# Patient Record
Sex: FEMALE | Race: WHITE | NOT HISPANIC OR LATINO | Employment: FULL TIME | URBAN - METROPOLITAN AREA
[De-identification: names, ages, dates, MRNs, and addresses within clinical notes are randomized per-mention and may not be internally consistent; named-entity substitution may affect disease eponyms.]

---

## 2017-04-11 PROCEDURE — G0145 SCR C/V CYTO,THINLAYER,RESCR: HCPCS | Performed by: OBSTETRICS & GYNECOLOGY

## 2017-04-12 ENCOUNTER — LAB REQUISITION (OUTPATIENT)
Dept: LAB | Facility: HOSPITAL | Age: 27
End: 2017-04-12
Payer: COMMERCIAL

## 2017-04-12 DIAGNOSIS — Z01.419 ENCOUNTER FOR GYNECOLOGICAL EXAMINATION WITHOUT ABNORMAL FINDING: ICD-10-CM

## 2017-04-12 DIAGNOSIS — Z11.51 ENCOUNTER FOR SCREENING FOR HUMAN PAPILLOMAVIRUS (HPV): ICD-10-CM

## 2017-04-19 LAB
LAB AP GYN PRIMARY INTERPRETATION: NORMAL
LAB AP LMP: NORMAL
Lab: NORMAL
PATH INTERP SPEC-IMP: NORMAL

## 2018-06-09 ENCOUNTER — HOSPITAL ENCOUNTER (EMERGENCY)
Facility: HOSPITAL | Age: 28
Discharge: HOME | End: 2018-06-09
Attending: EMERGENCY MEDICINE
Payer: COMMERCIAL

## 2018-06-09 ENCOUNTER — APPOINTMENT (EMERGENCY)
Dept: RADIOLOGY | Facility: HOSPITAL | Age: 28
End: 2018-06-09
Payer: COMMERCIAL

## 2018-06-09 VITALS
OXYGEN SATURATION: 98 % | RESPIRATION RATE: 18 BRPM | DIASTOLIC BLOOD PRESSURE: 93 MMHG | HEART RATE: 91 BPM | SYSTOLIC BLOOD PRESSURE: 153 MMHG | TEMPERATURE: 98.3 F

## 2018-06-09 DIAGNOSIS — M25.512 ACUTE PAIN OF LEFT SHOULDER: Primary | ICD-10-CM

## 2018-06-09 LAB
ATRIAL RATE: 87 BPM
P AXIS: 40 DEGREES
PR INTERVAL: 132 MS
QRS AXIS: 25 DEGREES
QRSD INTERVAL: 94 MS
QT INTERVAL: 342 MS
QTC INTERVAL: 405 MS
T WAVE AXIS: 53 DEGREES
VENTRICULAR RATE: 84 BPM

## 2018-06-09 PROCEDURE — 93010 ELECTROCARDIOGRAM REPORT: CPT | Performed by: INTERNAL MEDICINE

## 2018-06-09 PROCEDURE — 99283 EMERGENCY DEPT VISIT LOW MDM: CPT

## 2018-06-09 PROCEDURE — 73030 X-RAY EXAM OF SHOULDER: CPT

## 2018-06-09 PROCEDURE — 93005 ELECTROCARDIOGRAM TRACING: CPT

## 2018-06-09 RX ORDER — METHYLPREDNISOLONE 4 MG/1
TABLET ORAL
Qty: 21 TABLET | Refills: 0 | Status: SHIPPED | OUTPATIENT
Start: 2018-06-09 | End: 2018-08-05

## 2018-06-09 RX ORDER — BUSPIRONE HYDROCHLORIDE 5 MG/1
5 TABLET ORAL AS NEEDED
COMMUNITY
End: 2018-08-05

## 2018-06-09 RX ORDER — MULTIVIT WITH MINERALS/LUTEIN
1000 TABLET ORAL DAILY
COMMUNITY
End: 2018-08-05

## 2018-06-09 RX ORDER — TERBINAFINE HYDROCHLORIDE 250 MG/1
TABLET ORAL DAILY
COMMUNITY
End: 2018-08-05

## 2018-06-09 NOTE — DISCHARGE INSTRUCTIONS
Shoulder Bursitis   WHAT YOU NEED TO KNOW:   Shoulder bursitis is inflammation of the bursa in your shoulder  The bursa is a fluid-filled sac that acts as a cushion between a bone and a tendon  A tendon is a cord of strong tissue that connects muscles to bones  DISCHARGE INSTRUCTIONS:   Medicines:   · NSAIDs:  These medicines decrease swelling, pain, and fever  NSAIDs are available without a doctor's order  Ask your healthcare provider which medicine is right for you  Ask how much to take and when to take it  Take as directed  NSAIDs can cause stomach bleeding and kidney problems if not taken correctly  · Take your medicine as directed  Contact your healthcare provider if you think your medicine is not helping or if you have side effects  Tell him of her if you are allergic to any medicine  Keep a list of the medicines, vitamins, and herbs you take  Include the amounts, and when and why you take them  Bring the list or the pill bottles to follow-up visits  Carry your medicine list with you in case of an emergency  Self-care:   · Rest:  Rest your shoulder as much as possible to decrease pain and swelling  Slowly start to do more each day  Return to your daily activities as directed  · Ice:  Ice helps decrease swelling and pain  Ice may also help prevent tissue damage  Use an ice pack, or put crushed ice in a plastic bag  Cover it with a towel and place it on your shoulder for 15 to 20 minutes, 3 to 4 times each day, as directed  · Heat:  Heat helps decrease pain and stiffness  Apply heat on the area for 15 to 20 minutes, 3 to 4 times each day, as directed  · Sleep position:  Try to avoid lying on your injured shoulder  You may be more comfortable if you sleep on your stomach or back  Physical therapy:  A physical therapist teaches you exercises to help improve movement and strength, and to decrease pain     Prevent another shoulder injury:  Always stretch and do warmup and cool-down exercises before and after you exercise  This will help loosen your muscles and decrease stress on your shoulder  Rest between workouts  Follow up with your healthcare provider as directed:  Write down your questions so you remember to ask them during your visits  Contact your healthcare provider if:   · Your redness, pain, and swelling increase  · Your symptoms do not improve with treatment  · You have a fever  · You have questions or concerns about your condition or care  © 2017 [address] [facility] Information is for End User's use only and may not be sold, redistributed or otherwise used for commercial purposes  All illustrations and images included in CareNotes® are the copyrighted property of A D A M , Inc  or Jose Raines  The above information is an  only  It is not intended as medical advice for individual conditions or treatments  Talk to your doctor, nurse or pharmacist before following any medical regimen to see if it is safe and effective for you

## 2018-06-09 NOTE — ED PROVIDER NOTES
History  Chief Complaint   Patient presents with    Shoulder Pain     pt with left shoulder pain x 2 months, intermittent, has been getting worse over the past week  Pt has seen PCP was told pulled muscle, pt has not taken any medication for pain, no ROM limitation, + numbness and tingling down arm     58-year-old female presents chief complaint of left shoulder pain  Pain is in the anterior aspect of the left shoulder over the [facility] joint but does radiate into the shoulder blade and down in her left arm  Patient reports she was concerned about this possibly being related to her heart  This has been ongoing for a couple of months  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 as she is left handed  No chest pain, shortness of breath, palpitations, nausea, vomiting or diaphoresis  Patient has no significant past medical history  No loss of strength or perfusion in the arm  No numbness  History provided by:  Patient   used: No    Shoulder Pain   Location:  Shoulder  Shoulder location:  L shoulder  Pain details:     Quality:  Aching    Radiates to:  L arm    Severity:  Moderate    Onset quality:  Gradual    Duration:  2 months    Timing:  Intermittent    Progression:  Waxing and waning  Handedness:  Left-handed  Prior injury to area:  No  Relieved by:  Immobilization  Worsened by: Movement and stretching area  Ineffective treatments:  None tried  Associated symptoms: tingling    Associated symptoms: no fever, no muscle weakness, no stiffness and no swelling        Prior to Admission Medications   Prescriptions Last Dose Informant Patient Reported? Taking?    Ascorbic Acid (VITAMIN C) 1000 MG tablet   Yes Yes   Sig: Take 1,000 mg by mouth daily   Norethin-Eth Estradiol-Fe (GENERESS FE PO)   Yes Yes   Sig: Take 1 tablet by mouth daily   busPIRone (BUSPAR) 5 mg tablet   Yes Yes   Sig: Take 5 mg by mouth as needed   terbinafine (LamISIL) 250 mg tablet Yes Yes   Sig: Take by mouth daily Dose unknown      Facility-Administered Medications: None       Past Medical History:   Diagnosis Date    Kidney stones        Past Surgical History:   Procedure Laterality Date    LITHOTRIPSY  2011    MOUTH SURGERY         History reviewed  No pertinent family history  I have reviewed and agree with the history as documented  Social History   Substance Use Topics    Smoking status: Never Smoker    Smokeless tobacco: Never Used    Alcohol use 1 2 oz/week     2 Cans of beer per week      Comment: socially        Review of Systems   Constitutional: Negative for chills, diaphoresis and fever  Respiratory: Negative for shortness of breath  Cardiovascular: Negative for chest pain and palpitations  Gastrointestinal: Negative for diarrhea, nausea and vomiting  Genitourinary: Negative for dysuria and frequency  Musculoskeletal: Positive for arthralgias (left shoulder)  Negative for stiffness  Skin: Negative for rash  All other systems reviewed and are negative  Physical Exam  Physical Exam   Constitutional: She is oriented to person, place, and time  She appears well-developed and well-nourished  No distress  HENT:   Head: Normocephalic and atraumatic  Eyes: EOM are normal  Pupils are equal, round, and reactive to light  Neck: Normal range of motion  No JVD present  Cardiovascular: Normal rate, regular rhythm, normal heart sounds and intact distal pulses  Exam reveals no gallop and no friction rub  No murmur heard  Pulmonary/Chest: Effort normal and breath sounds normal  No respiratory distress  She has no wheezes  She has no rales  She exhibits no tenderness  Musculoskeletal: Normal range of motion  She exhibits tenderness (left ac joint)  She exhibits no deformity  Neurological: She is alert and oriented to person, place, and time  Skin: Skin is warm and dry  Psychiatric: She has a normal mood and affect   Her behavior is normal  Judgment and thought content normal    Nursing note and vitals reviewed  Vital Signs  ED Triage Vitals [06/09/18 1102]   Temperature Pulse Respirations Blood Pressure SpO2   98 3 °F (36 8 °C) 91 18 153/93 98 %      Temp Source Heart Rate Source Patient Position - Orthostatic VS BP Location FiO2 (%)   Oral Monitor Sitting Left arm --      Pain Score       5           Vitals:    06/09/18 1102   BP: 153/93   Pulse: 91   Patient Position - Orthostatic VS: Sitting       Visual Acuity      ED Medications  Medications - No data to display    Diagnostic Studies  Results Reviewed     None                 XR shoulder 2+ views LEFT   ED Interpretation by Dash Yusuf MD (06/09 1156)   This film was interpreted independently by me  No fracture or dislocation                     Procedures  ECG 12 Lead Documentation  Date/Time: 6/9/2018 11:50 AM  Performed by: Winston Sibley by: Evin Crawford     Indications / Diagnosis:  Left shoulder pain  ECG reviewed by me, the ED Provider: yes    Patient location:  ED  Previous ECG:     Previous ECG:  Unavailable    Comparison to cardiac monitor: No    Interpretation:     Interpretation: normal    Rate:     ECG rate:  84    ECG rate assessment: normal    Rhythm:     Rhythm: sinus rhythm    Ectopy:     Ectopy: none    QRS:     QRS axis:  Normal  Conduction:     Conduction: abnormal      Abnormal conduction: incomplete RBBB    ST segments:     ST segments:  Normal  T waves:     T waves: normal             Phone Contacts  ED Phone Contact    ED Course                               MDM  Number of Diagnoses or Management Options  Acute pain of left shoulder: new and requires workup  Diagnosis management comments: Background: 32 y o  female presents with left shoulder pain    Differential DX includes but is not limited to: ac joint sprain, subacromial bursitis, doubt atypical acs,     Plan: xray, ekg, likely treat with anti-inflammatory regimen and refer to physical therapy Amount and/or Complexity of Data Reviewed  Tests in the radiology section of CPT®: ordered and reviewed  Tests in the medicine section of CPT®: ordered and reviewed    Risk of Complications, Morbidity, and/or Mortality  Diagnostic procedures: high    Patient Progress  Patient progress: stable    CritCare Time    Disposition  Final diagnoses:   Acute pain of left shoulder     Time reflects when diagnosis was documented in both MDM as applicable and the Disposition within this note     Time User Action Codes Description Comment    6/9/2018 11:56 AM Ashanti FULLER Add [D21 178] Acute pain of left shoulder       ED Disposition     ED Disposition Condition Comment    Discharge  [address] discharge to home/self care  Condition at discharge: Good        Follow-up Information     Follow up With Specialties Details Why [address]   As needed [place]  [phone]          Patient's Medications   Discharge Prescriptions    METHYLPREDNISOLONE (MEDROL) 4 MG TABLET    Dosepack (follow instructions on packaging) All doses PO, 6 tabs/day1, 5 tabs/day 2, 4 tabs/day3, 3 tabs/day4, 2 tabs/day5, 1 tab/day6       Start Date: 6/9/2018  End Date: --       Order Dose: --       Quantity: 21 tablet    Refills: 0     No discharge procedures on file      ED Provider  Electronically Signed by           Ariane Velasco MD  06/09/18 1480

## 2018-06-26 ENCOUNTER — EVALUATION (OUTPATIENT)
Dept: PHYSICAL THERAPY | Facility: CLINIC | Age: 28
End: 2018-06-26
Payer: COMMERCIAL

## 2018-06-26 DIAGNOSIS — M25.512 ACUTE PAIN OF LEFT SHOULDER: Primary | ICD-10-CM

## 2018-06-26 PROCEDURE — G8991 OTHER PT/OT GOAL STATUS: HCPCS | Performed by: PHYSICAL THERAPIST

## 2018-06-26 PROCEDURE — 97162 PT EVAL MOD COMPLEX 30 MIN: CPT | Performed by: PHYSICAL THERAPIST

## 2018-06-26 PROCEDURE — 97110 THERAPEUTIC EXERCISES: CPT | Performed by: PHYSICAL THERAPIST

## 2018-06-26 PROCEDURE — G8990 OTHER PT/OT CURRENT STATUS: HCPCS | Performed by: PHYSICAL THERAPIST

## 2018-06-26 NOTE — PROGRESS NOTES
PT Evaluation     Today's date: 2018  Patient name: Gilda Melendez  : 1990  MRN: [id number]  Referring provider: Cornelio Washington PT  Dx:   Encounter Diagnosis     ICD-10-CM    1  Acute pain of left shoulder M25 512                   Assessment  Impairments: abnormal or restricted ROM, impaired physical strength, pain with function and scapular dyskinesis    Assessment details: Patient presents with signs and symptoms consistent with referring diagnosis  Positive prognostic indicators include:  improved past 2 weeks, motivated to improve Negative prognostic indicators include: work that requires overhead use of shoulder routinely  She presents with: pain, decreased: ROM, strength and  functional capacity  She has mild capsular tightness, poor postural control and positive impingement signs  She requires skilled PT to address these deficits and restore maximal functional capacity  Understanding of Dx/Px/POC: good   Prognosis: good    Goals  ST-6 weeks  1  Patient to be independent with HEP  2   Decrease pain at least 2 subjective levels  LT-12 weeks  1    Patient to voice comfort with self management of condition  2   75% or > decreased pain  3   75% or > decreased functional deficits  4   Normalize AROM of all deficit planes  5   Normalize strength  6   Functional Status Score: 75  7  Patient to voice understanding of activities/positions to avoid        Plan  Patient would benefit from: skilled PT  Referral necessary: No  Planned modality interventions: cryotherapy  Planned therapy interventions: IADL retraining, joint mobilization, manual therapy, motor coordination training, neuromuscular re-education, patient education, postural training, self care, strengthening, stretching, therapeutic activities, therapeutic exercise, home exercise program, flexibility, ADL training, balance and body mechanics training  Frequency: 2x week  Duration in weeks: 6  Treatment plan discussed with: patient        Subjective Evaluation    History of Present Illness  Onset date: 2 months ago  Mechanism of injury: Chief Complaint: L shoulder pain    History:  Pt notes insidious onset of L Shoulder pain 2 months ago but also had some paresthesias into her L UE with occasional parestheias into her L arm/hand  Symptoms were progressively worsening  She was seen in the ER  X rays were negative  She was given pain meds and told to go to PT  Numbness has improved and pain is no longer constant since going to the ER and having a short term medication  She works as a optathalamic assistant with frequent reaching  PMH: kidney stones     Aggravating factors: sleeping (side sleeper), reaching at work  lifiting weights, reaching behind her back  Carrying items  Relieving factors: ice    Functional Deficits: sleeping, lifting weights carrying laundry    Patient Goals: feel better    Quality of life: good    Pain  At best pain ratin  At worst pain ratin  Location: L shoulder      Diagnostic Tests  X-ray: normal        Objective     Postural Observations  Seated posture: fair  Standing posture: fair  Correction of posture: makes symptoms better        Cervical/Thoracic Screen   Cervical range of motion within normal limits  Cervical range of motion within normal limits with the following exceptions: (-) Cervical mechanical assessment/screen on symptoms  (-) Neuromotor exam    Active Range of Motion   Left Shoulder   Flexion: 170 degrees   Abduction: WFL  External rotation 0°: WFL  External rotation BTH: WFL    Passive Range of Motion   Left Shoulder   Normal passive range of motion    Scapular Mobility     Additional Scapular Mobility Details    Mild Scapular Dyskinesis with overhead elevation    Joint Play   Left Shoulder  Hypomobile in the inferior capsule      Strength/Myotome Testing     Left Shoulder     Planes of Motion   Flexion: 4+   Abduction: 4+   External rotation at 0°: 4+ Internal rotation at 0°: 5     Additional Strength Details  Elbow Screen [place]    Tests     Additional Tests Details  (+) TTP SubAcromial Space, LH Bicepts Tendon  (-) Lag signs  (+) Impingement Testing  (-) Labral/Stability Testing          Precautions:  Direct Access    Daily Treatment Diary       Manuals 6/26         Inf/lat [facility] mobs                                                   Exercise Diary          HEP 10'         Scap Squeezes          Sleeper Str          TB Rows          TB Shld Ext          SL ER          Prone Row/Ret          Prone Shld Ext          Prone Hoiz Abd          Doorway Pec Str          Serratus Alphabet                                                                                                              Modalities          CP prn

## 2018-06-28 ENCOUNTER — OFFICE VISIT (OUTPATIENT)
Dept: PHYSICAL THERAPY | Facility: CLINIC | Age: 28
End: 2018-06-28
Payer: COMMERCIAL

## 2018-06-28 DIAGNOSIS — M25.512 ACUTE PAIN OF LEFT SHOULDER: Primary | ICD-10-CM

## 2018-06-28 PROCEDURE — 97140 MANUAL THERAPY 1/> REGIONS: CPT | Performed by: PHYSICAL THERAPIST

## 2018-06-28 PROCEDURE — 97112 NEUROMUSCULAR REEDUCATION: CPT | Performed by: PHYSICAL THERAPIST

## 2018-06-28 PROCEDURE — 97110 THERAPEUTIC EXERCISES: CPT | Performed by: PHYSICAL THERAPIST

## 2018-06-28 NOTE — PROGRESS NOTES
Daily Note     Today's date: 2018  Patient name: Paty Brown  : 1990  MRN: [id number]  Referring provider: Malini Tovar, PT  Dx:   Encounter Diagnosis     ICD-10-CM    1  Acute pain of left shoulder M25 512                   Subjective: Shoulder feels a bit better, did initiate HEP      Objective: See treatment diary below  Precautions:  Direct Access    Daily Treatment Diary       Manuals         Inf/lat GH mobs  10'                                                 Exercise Diary          HEP 10'         Scap Squeezes          Sleeper Str  :10/10x        TB Rows  Gr :03/20x        TB Shld Ext  :03/20x        SL ER  :03/20x        Prone Row/Ret  2# 20x        Prone Shld Ext  2# 20x        Prone Hoiz Abd  2# 20x        Doorway Pec Str  :20/6x        Serratus Alphabet  2# 1x                                                                                                            Modalities          CP prn                        Assessment: Tolerated treatment well  Patient would benefit from continued PT      Plan: Continue per plan of care

## 2018-07-03 ENCOUNTER — APPOINTMENT (OUTPATIENT)
Dept: PHYSICAL THERAPY | Facility: CLINIC | Age: 28
End: 2018-07-03
Payer: COMMERCIAL

## 2018-07-05 ENCOUNTER — OFFICE VISIT (OUTPATIENT)
Dept: PHYSICAL THERAPY | Facility: CLINIC | Age: 28
End: 2018-07-05
Payer: COMMERCIAL

## 2018-07-05 DIAGNOSIS — M25.512 ACUTE PAIN OF LEFT SHOULDER: Primary | ICD-10-CM

## 2018-07-05 PROCEDURE — 97110 THERAPEUTIC EXERCISES: CPT | Performed by: PHYSICAL THERAPIST

## 2018-07-05 PROCEDURE — 97112 NEUROMUSCULAR REEDUCATION: CPT | Performed by: PHYSICAL THERAPIST

## 2018-07-05 NOTE — PROGRESS NOTES
Daily Note     Today's date: 2018  Patient name: Gilda Melendez  : 1990  MRN: [id number]  Referring provider: Cornelio Washington, PT  Dx:   Encounter Diagnosis     ICD-10-CM    1  Acute pain of left shoulder M25 512                   Subjective: Patient with no significant change in sx since LV  Objective: See treatment diary below  Manuals        Inf/lat GH mobs  10' 10'                                                Exercise Diary          HEP 10'         Scap Squeezes          Sleeper Str  :10/10x hep       TB Rows  Gr :03/20x X 20 GR       TB Shld Ext  :03/20x X 20       SL ER  :03/20x X 20       Prone Row/Ret  2# 20x 2# x 20       Prone Shld Ext  2# 20x 2#x20       Prone Hoiz Abd  2# 20x 2# x 20       Doorway Pec Str  :20/6x 6 x :20       Serratus Alphabet  2# 1x 2# x 2       Pulley  5' 5'       TB ER/IR   GTB x 20                                                                                       Modalities          CP   10'                       Assessment: Tolerated treatment well  Patient would benefit from continued PT  Unbilled x 15'      Plan: Continue per plan of care

## 2018-07-10 ENCOUNTER — OFFICE VISIT (OUTPATIENT)
Dept: PHYSICAL THERAPY | Facility: CLINIC | Age: 28
End: 2018-07-10
Payer: COMMERCIAL

## 2018-07-10 DIAGNOSIS — M25.512 ACUTE PAIN OF LEFT SHOULDER: Primary | ICD-10-CM

## 2018-07-10 PROCEDURE — 97140 MANUAL THERAPY 1/> REGIONS: CPT | Performed by: PHYSICAL THERAPIST

## 2018-07-10 PROCEDURE — 97110 THERAPEUTIC EXERCISES: CPT | Performed by: PHYSICAL THERAPIST

## 2018-07-10 NOTE — PROGRESS NOTES
Daily Note     Today's date: 7/10/2018  Patient name: Be Sofia  : 1990  MRN: [id number]  Referring provider: Rukhsana Kiran PT  Dx:   Encounter Diagnosis     ICD-10-CM    1  Acute pain of left shoulder M25 512                   Subjective: Patient states she was a bit sore after swimming yesterday and driving due to anterior shoulder pain  Objective: See treatment diary below  Manuals        Inf/lat GH mobs  10' 10'                                                Exercise Diary          HEP 10'         Scap Squeezes          Sleeper Str  :10/10x hep       TB Rows  Gr :03/20x X 20 GR       TB Shld Ext  :03/20x X 20       SL ER  :03/20x X 20       Prone Row/Ret  2# 20x 2# x 20       Prone Shld Ext  2# 20x 2#x20       Prone Hoiz Abd  2# 20x 2# x 20       Doorway Pec Str  :20/6x 6 x :20       Serratus Alphabet  2# 1x 2# x 2       Pulley  5' 5'       TB ER/IR   GTB x 20       Body blade m/l a/p   3x :20       Bent over rows          Quad ue/LE                                                            Modalities          CP   10'                       Assessment: Tolerated treatment well  Patient would benefit from continued PT  Progressed with body blade and had some soreness afterwards  Updated HEP  Plan: Continue per plan of care

## 2018-07-12 ENCOUNTER — APPOINTMENT (OUTPATIENT)
Dept: PHYSICAL THERAPY | Facility: CLINIC | Age: 28
End: 2018-07-12
Payer: COMMERCIAL

## 2018-07-17 ENCOUNTER — OFFICE VISIT (OUTPATIENT)
Dept: PHYSICAL THERAPY | Facility: CLINIC | Age: 28
End: 2018-07-17
Payer: COMMERCIAL

## 2018-07-17 DIAGNOSIS — M25.512 ACUTE PAIN OF LEFT SHOULDER: Primary | ICD-10-CM

## 2018-07-17 PROCEDURE — 97110 THERAPEUTIC EXERCISES: CPT | Performed by: PHYSICAL THERAPIST

## 2018-07-17 PROCEDURE — 97112 NEUROMUSCULAR REEDUCATION: CPT | Performed by: PHYSICAL THERAPIST

## 2018-07-17 PROCEDURE — 97140 MANUAL THERAPY 1/> REGIONS: CPT | Performed by: PHYSICAL THERAPIST

## 2018-07-17 NOTE — PROGRESS NOTES
Daily Note     Today's date: 2018  Patient name: Marie Ravi  : 1990  MRN: [id number]  Referring provider: Nils Carroll, PT  Dx:   Encounter Diagnosis     ICD-10-CM    1  Acute pain of left shoulder M25 512                   Subjective: Pt reports her shoulder if feeling good  She said she felt a little muscle soreness after her last therapy session  She said overall she is feeling really well  Objective: See treatment diary below      Assessment: Tolerated treatment well  Patient demonstrated fatigue post treatment, exhibited good technique with therapeutic exercises, would benefit from continued PT and did well with BTB and synamic stabilization exercises  She was given a BTB to take home becaue she expressed interest in performing band exercises at home  Plan: Continue per plan of care  Progress treatment as tolerated        Manuals       Inf/lat GH mobs  10' 10' 5'      STM L upper trap    5'                                     Exercise Diary          HEP 10'         Scap Squeezes          Sleeper Str  :10/10x hep       TB Rows  Gr :03/20x X 21 GR BTB x15      TB Shld Ext  :03/20x X 20 BTB x15      SL ER  :03/20x X 20 x20      Prone Row/Ret  2# 20x 2# x 20 2# 20x      Prone Shld Ext  2# 20x 2#x20 2# 20x      Prone Hoiz Abd  2# 20x 2# x 20 2# 20x      Doorway Pec Str  :20/6x 6 x :20 20"x6 L      Serratus Alphabet  2# 1x 2# x 2 2# 2x      Pulley  5' 5' 5'      TB ER/IR   GTB x 20 GTB x20      Body blade m/l a/p   3x :20 20"x3      Bent over rows          Quad ue/LE          Supine dynamic stabilization    15"x3                                              Modalities          CP   10' 10'

## 2018-07-19 ENCOUNTER — OFFICE VISIT (OUTPATIENT)
Dept: PHYSICAL THERAPY | Facility: CLINIC | Age: 28
End: 2018-07-19
Payer: COMMERCIAL

## 2018-07-19 DIAGNOSIS — M25.512 ACUTE PAIN OF LEFT SHOULDER: Primary | ICD-10-CM

## 2018-07-19 PROCEDURE — 97110 THERAPEUTIC EXERCISES: CPT | Performed by: PHYSICAL THERAPIST

## 2018-07-19 PROCEDURE — 97140 MANUAL THERAPY 1/> REGIONS: CPT | Performed by: PHYSICAL THERAPIST

## 2018-07-19 PROCEDURE — 97112 NEUROMUSCULAR REEDUCATION: CPT | Performed by: PHYSICAL THERAPIST

## 2018-07-19 NOTE — PROGRESS NOTES
Daily Note     Today's date: 2018  Patient name: Marie Ravi  : 1990  MRN: [id number]  Referring provider: Nils Carroll, PT  Dx:   Encounter Diagnosis     ICD-10-CM    1  Acute pain of left shoulder M25 512                   Subjective: Pt denies pain upon starting or ending PT session  She does report that her primary limtiaiotn remains pain with OH activities  She went to a ramu class and noted pain after this, rating her symptoms at a 5/10  Objective: See treatment diary below      Assessment: Tolerated treatment well  Patient demonstrated fatigue post treatment, exhibited good technique with therapeutic exercises, would benefit from continued PT and will work on [facility] mechanics and scapular stabilizers when performing OH activities,      Plan: Continue per plan of care  Progress treatment as tolerated  Re-eval NV        Manuals      Inf/lat GH mobs  10' 10' 5' 5'     STM L upper trap    5' 5'                                    Exercise Diary          HEP 10'         Scap Squeezes          Sleeper Str  :10/10x hep       TB Rows  Gr :03/20x X 21 GR BTB x15 BTB x15     TB Shld Ext  :03/20x X 20 BTB x15 BTB x15     SL ER  :03/20x X 20 x20      Prone Row/Ret  2# 20x 2# x 20 2# 20x 2# 20x     Prone Shld Ext  2# 20x 2#x20 2# 20x 2# 20x     Prone Hoiz Abd  2# 20x 2# x 20 2# 20x 2# 20x     Doorway Pec Str  :20/6x 6 x :20 20"x6 L 20"x6     Serratus Alphabet  2# 1x 2# x 2 2# 2x 2# 2x     Pulley  5' 5' 5' 5'     TB ER/IR   GTB x 20 GTB x20 GTB 20x     Body blade m/l a/p   3x :20 20"x3 20"x3     Bent over rows          Quad ue/LE          Supine dynamic stabilization    15"x3 15"x3                                             Modalities          CP   10' 10'

## 2018-07-24 ENCOUNTER — EVALUATION (OUTPATIENT)
Dept: PHYSICAL THERAPY | Facility: CLINIC | Age: 28
End: 2018-07-24
Payer: COMMERCIAL

## 2018-07-24 ENCOUNTER — TRANSCRIBE ORDERS (OUTPATIENT)
Dept: PHYSICAL THERAPY | Facility: CLINIC | Age: 28
End: 2018-07-24

## 2018-07-24 DIAGNOSIS — M25.512 LEFT SHOULDER PAIN, UNSPECIFIED CHRONICITY: Primary | ICD-10-CM

## 2018-07-24 DIAGNOSIS — M25.512 ACUTE PAIN OF LEFT SHOULDER: Primary | ICD-10-CM

## 2018-07-24 PROCEDURE — 97112 NEUROMUSCULAR REEDUCATION: CPT | Performed by: PHYSICAL THERAPIST

## 2018-07-24 PROCEDURE — 97110 THERAPEUTIC EXERCISES: CPT | Performed by: PHYSICAL THERAPIST

## 2018-07-24 PROCEDURE — G8990 OTHER PT/OT CURRENT STATUS: HCPCS | Performed by: PHYSICAL THERAPIST

## 2018-07-24 PROCEDURE — 97140 MANUAL THERAPY 1/> REGIONS: CPT | Performed by: PHYSICAL THERAPIST

## 2018-07-24 PROCEDURE — G8991 OTHER PT/OT GOAL STATUS: HCPCS | Performed by: PHYSICAL THERAPIST

## 2018-07-24 NOTE — PROGRESS NOTES
PT Re-Evaluation     Today's date: 2018  Patient name: Tammy Goodman  : 1990  MRN: [id number]  Referring provider: Fredrica Rinne, C*  Dx:   Encounter Diagnosis     ICD-10-CM    1  Acute pain of left shoulder M25 512                   Assessment  Impairments: abnormal or restricted ROM, impaired physical strength, pain with function and scapular dyskinesis    Assessment details: Tammy Goodman has attended 7  visits since initiating skilled PT and has demonstrated overall improvement in mobility, strength, and function, with a reduction in pain  Currently, they have made steady progress towards their goals, but continue to remain limited with        At this time, skilled physical therapy is warranted in order to address their remaining impairments and aide in return to functional activities  It is recommended that they continue to be seen 2x/week for an additional 4 weeks  Thank you for this pleasant referral!     Understanding of Dx/Px/POC: good   Prognosis: good    Goals  ST-6 weeks  1  Patient to be independent with HEP  -MET  2  Decrease pain at least 2 subjective levels  -MET    LT-12 weeks  1    Patient to voice comfort with self management of condition - PARTIALLY MET  2   75% or > decreased pain  - MET  3   75% or > decreased functional deficits  - PARTIALLY MET  4  Normalize AROM of all deficit planes  - MET  5  Normalize strength  - PARTIALLY MET  6  Functional Status Score: 75- MET  7  Patient to voice understanding of activities/positions to avoid  - MET      Plan  Patient would benefit from: skilled PT  Referral necessary: No  Planned modality interventions: cryotherapy and biofeedback  Planned therapy interventions: IADL retraining, joint mobilization, manual therapy, motor coordination training, neuromuscular re-education, patient education, postural training, self care, strengthening, stretching, therapeutic activities, therapeutic exercise, home exercise program, flexibility, ADL training, balance and body mechanics training  Frequency: 2x week  Duration in weeks: 3  Treatment plan discussed with: patient        Subjective Evaluation    History of Present Illness  Onset date: 2 months ago  Mechanism of injury: Chief Complaint: L shoulder pain    History:  Pt reports that her left shoulder has improved overall  She notes that she still has pain with repetitive overhead reaching, or quick movements  She has also began to notice some pain in her right shoulder that has begun over the past week and has not improved with rest  She notes that lifting things like her laundry basket are painful  PMH: kidney stones     Aggravating factors: sleeping (side sleeper), reaching at work  lifiting weights, reaching behind her back  Carrying items  Relieving factors: ice    Functional Deficits: sleeping, lifting weights carrying laundry    Patient Goals: feel better    Quality of life: good    Pain  At best pain ratin  At worst pain ratin  Location: L shoulder      Diagnostic Tests  X-ray: normal        Objective     Postural Observations  Seated posture: good  Standing posture: good  Correction of posture: makes symptoms better        Cervical/Thoracic Screen   Cervical range of motion within normal limits  Cervical range of motion within normal limits with the following exceptions: (-) Cervical mechanical assessment/screen on symptoms  (-) Neuromotor exam    Active Range of Motion   Left Shoulder   Flexion: WFL  Abduction: WFL  External rotation 0°: WFL  External rotation BTH: WFL    Passive Range of Motion   Left Shoulder   Normal passive range of motion    Scapular Mobility     Additional Scapular Mobility Details    Mild Scapular Dyskinesis with overhead elevation (REMAINS)    Joint Play   Left Shoulder  Hypomobile in the inferior capsule      Strength/Myotome Testing     Left Shoulder     Planes of Motion   Flexion: 5   Abduction: 5   External rotation at 0°: 4+ Internal rotation at 0°: 5     Additional Strength Details  [facility] [place]    Tests     Additional Tests Details  (+) TTP SubAcromial Space, LH Bicepts Tendon- RESOLVED ON L, REMAINS ON R  (-) Lag signs  (+) Impingement Testing- RESOLVED ON L, REMAINS ON R  (-) Labral/Stability Testing      Flowsheet Rows      Most Recent Value   PT/OT G-Codes   Current Score  84   Projected Score  75   FOTO information reviewed  Yes   Assessment Type  Re-evaluation   G code set  Other PT/OT Primary   Other PT Primary Current Status ()  CI   Other PT Primary Goal Status ()  CJ          Precautions:  Direct Access    Manuals 6/26 6/28 7/5 7/17 7/19 7/24    Inf/lat GH mobs  10' 10' 5' 5' 5'    STM L upper trap    5' 5' 5'                                   Exercise Diary          HEP 10'         Scap Squeezes          Sleeper Str  :10/10x hep       TB Rows  Gr :03/20x X 21 GR BTB x15 BTB x15 BTB     TB Shld Ext  :03/20x X 20 BTB x15 BTB x15 BTB x15    SL ER  :03/20x X 20 x20  20x    Prone Row/Ret  2# 20x 2# x 20 2# 20x 2# 20x 2# 20x    Prone Shld Ext  2# 20x 2#x20 2# 20x 2# 20x 2# 20x    Prone Hoiz Abd  2# 20x 2# x 20 2# 20x 2# 20x 2# 20x    Doorway Pec Str  :20/6x 6 x :20 20"x6 L 20"x6 20"x6     Serratus Alphabet  2# 1x 2# x 2 2# 2x 2# 2x 2# 2x    Pulley  5' 5' 5' 5' 5'    TB ER/IR   GTB x 20 GTB x20 GTB 20x GTB 20x    Body blade m/l a/p   3x :20 20"x3 20"x3 20"x3    Bent over rows          Quad ue/LE          Supine dynamic stabilization    15"x3 15"x3 15" x3                                            Modalities          CP   10' 10'

## 2018-07-24 NOTE — LETTER
2018    Sandra Cifuentes, [address]  [address] [place], [place] [address]  [address]    Patient: Roxanna Corbin   YOB: 1990   Date of Visit: 2018     Encounter Diagnosis     ICD-10-CM    1  Acute pain of left shoulder M25 512        Dear Dr Amber Gamboa:    Please review the attached Plan of Care from [facility]'s recent visit  Please verify that you agree therapy should continue by signing the attached document and sending it back to our office  If you have any questions or concerns, please don't hesitate to call  Sincerely,    Collette Smalls, PT      Referring Provider:      I certify that I have read the below Plan of Care and certify the need for these services furnished under this plan of treatment while under my care  Joy Babinzenobiasarah Mary [address] [facility]  [address]  [address]: [phone]          PT Re-Evaluation     Today's date: 2018  Patient name: Roxanna Corbin  : 1990  MRN: [id number]  Referring provider: LEESA Walker*  Dx:   Encounter Diagnosis     ICD-10-CM    1  Acute pain of left shoulder M25 512                   Assessment  Impairments: abnormal or restricted ROM, impaired physical strength, pain with function and scapular dyskinesis    Assessment details: Roxanna Corbin has attended 7  visits since initiating skilled PT and has demonstrated overall improvement in mobility, strength, and function, with a reduction in pain  Currently, they have made steady progress towards their goals, but continue to remain limited with        At this time, skilled physical therapy is warranted in order to address their remaining impairments and aide in return to functional activities  It is recommended that they continue to be seen 2x/week for an additional 4 weeks  Thank you for this pleasant referral!     Understanding of Dx/Px/POC: good   Prognosis: good    Goals  ST-6 weeks  1    Patient to be independent with HEP  -MET  2  Decrease pain at least 2 subjective levels  -MET    LT-12 weeks  1    Patient to voice comfort with self management of condition - PARTIALLY MET  2   75% or > decreased pain  - MET  3   75% or > decreased functional deficits  - PARTIALLY MET  4  Normalize AROM of all deficit planes  - MET  5  Normalize strength  - PARTIALLY MET  6  Functional Status Score: 75- MET  7  Patient to voice understanding of activities/positions to avoid  - MET      Plan  Patient would benefit from: skilled PT  Referral necessary: No  Planned modality interventions: cryotherapy and biofeedback  Planned therapy interventions: IADL retraining, joint mobilization, manual therapy, motor coordination training, neuromuscular re-education, patient education, postural training, self care, strengthening, stretching, therapeutic activities, therapeutic exercise, home exercise program, flexibility, ADL training, balance and body mechanics training  Frequency: 2x week  Duration in weeks: 3  Treatment plan discussed with: patient        Subjective Evaluation    History of Present Illness  Onset date: 2 months ago  Mechanism of injury: Chief Complaint: L shoulder pain    History:  Pt reports that her left shoulder has improved overall  She notes that she still has pain with repetitive overhead reaching, or quick movements  She has also began to notice some pain in her right shoulder that has begun over the past week and has not improved with rest  She notes that lifting things like her laundry basket are painful  PMH: kidney stones     Aggravating factors: sleeping (side sleeper), reaching at work  lifiting weights, reaching behind her back  Carrying items       Relieving factors: ice    Functional Deficits: sleeping, lifting weights carrying laundry    Patient Goals: feel better    Quality of life: good    Pain  At best pain ratin  At worst pain ratin  Location: L shoulder      Diagnostic Tests  X-ray: normal        Objective     Postural Observations  Seated posture: good  Standing posture: good  Correction of posture: makes symptoms better        Cervical/Thoracic Screen   Cervical range of motion within normal limits  Cervical range of motion within normal limits with the following exceptions: (-) Cervical mechanical assessment/screen on symptoms  (-) Neuromotor exam    Active Range of Motion   Left Shoulder   Flexion: WFL  Abduction: WFL  External rotation 0°:  WFL  External rotation BTH: WFL    Passive Range of Motion   Left Shoulder   Normal passive range of motion    Scapular Mobility     Additional Scapular Mobility Details    Mild Scapular Dyskinesis with overhead elevation (REMAINS)    Joint Play   Left Shoulder  Hypomobile in the inferior capsule      Strength/Myotome Testing     Left Shoulder     Planes of Motion   Flexion: 5   Abduction: 5   External rotation at 0°:  4+   Internal rotation at 0°:  5     Additional Strength Details  Elbow Screen [facility]    Tests     Additional Tests Details  (+) TTP SubAcromial Space, LH Bicepts Tendon- RESOLVED ON L, REMAINS ON R  (-) Lag signs  (+) Impingement Testing- RESOLVED ON L, REMAINS ON R  (-) Labral/Stability Testing      Flowsheet Rows      Most Recent Value   PT/OT G-Codes   Current Score  84   Projected Score  75   FOTO information reviewed  Yes   Assessment Type  Re-evaluation   G code set  Other PT/OT Primary   Other PT Primary Current Status ()  CI   Other PT Primary Goal Status ()  CJ          Precautions:  Direct Access    Manuals 6/26 6/28 7/5 7/17 7/19 7/24    Inf/lat GH mobs  10' 10' 5' 5' 5'    STM L upper trap    5' 5' 5'                                   Exercise Diary          HEP 10'         Scap Squeezes          Sleeper Str  :10/10x hep       TB Rows  Gr :03/20x X 21 GR BTB x15 BTB x15 BTB     TB Shld Ext  :03/20x X 20 BTB x15 BTB x15 BTB x15    SL ER  :03/20x X 20 x20  20x    Prone Row/Ret  2# 20x 2# x 20 2# 20x 2# 20x 2# 20x    Prone Shld Ext  2# 20x 2#x20 2# 20x 2# 20x 2# 20x    Prone Hoiz Abd  2# 20x 2# x 20 2# 20x 2# 20x 2# 20x    Doorway Pec Str  :20/6x 6 x :20 20"x6 L 20"x6 20"x6     Serratus Alphabet  2# 1x 2# x 2 2# 2x 2# 2x 2# 2x    Pulley  5' 5' 5' 5' 5'    TB ER/IR   GTB x 20 GTB x20 GTB 20x GTB 20x    Body blade m/l a/p   3x :20 20"x3 20"x3 20"x3    Bent over rows          Quad ue/LE          Supine dynamic stabilization    15"x3 15"x3 15" x3                                            Modalities          CP   10' 10'

## 2018-07-26 ENCOUNTER — APPOINTMENT (OUTPATIENT)
Dept: PHYSICAL THERAPY | Facility: CLINIC | Age: 28
End: 2018-07-26
Payer: COMMERCIAL

## 2018-08-02 ENCOUNTER — OFFICE VISIT (OUTPATIENT)
Dept: PHYSICAL THERAPY | Facility: CLINIC | Age: 28
End: 2018-08-02
Payer: COMMERCIAL

## 2018-08-02 DIAGNOSIS — M25.512 ACUTE PAIN OF LEFT SHOULDER: Primary | ICD-10-CM

## 2018-08-02 PROCEDURE — 97110 THERAPEUTIC EXERCISES: CPT | Performed by: PHYSICAL THERAPIST

## 2018-08-02 PROCEDURE — 97140 MANUAL THERAPY 1/> REGIONS: CPT | Performed by: PHYSICAL THERAPIST

## 2018-08-02 PROCEDURE — 97112 NEUROMUSCULAR REEDUCATION: CPT | Performed by: PHYSICAL THERAPIST

## 2018-08-02 NOTE — PROGRESS NOTES
Daily Note     Today's date: 2018  Patient name: Paty Brown  : 1990  MRN: [id number]  Referring provider: Malini Tovar, PT  Dx:   Encounter Diagnosis     ICD-10-CM    1  Acute pain of left shoulder M25 512                   Subjective: Pt notes that she tried to do push ups the other day and noted pain at that time  It has since subsided and she is feeling much better  Objective: See treatment diary below      Assessment: Tolerated treatment well  Patient demonstrated fatigue post treatment, exhibited good technique with therapeutic exercises, would benefit from continued PT and worked on wall push ups with proper form  Will progress push ups as able,      Plan: Continue per plan of care  Progress treatment as tolerated          Precautions:  Direct Access    Manuals    Inf/lat GH mobs  10' 10' 5' 5' 5' 5'   STM L upper trap    5' 5' 5' 5'                                  Exercise Diary          HEP 10'         Scap Squeezes          Sleeper Str  :10/10x hep       TB Rows  Gr :03/20x X 21 GR BTB x15 BTB x15 BTB  BTB 20x   TB Shld Ext  :03/20x X 20 BTB x15 BTB x15 BTB x15 BTB 20x   SL ER  :03/20x X 20 x20  20x 20x 2#   Prone Row/Ret  2# 20x 2# x 20 2# 20x 2# 20x 2# 20x 2# 20x   Prone Shld Ext  2# 20x 2#x20 2# 20x 2# 20x 2# 20x 2# 20x   Prone Hoiz Abd  2# 20x 2# x 20 2# 20x 2# 20x 2# 20x 2# 20x   Doorway Pec Str  :20/6x 6 x :20 20"x6 L 20"x6 20"x6  30"x4   Serratus Alphabet  2# 1x 2# x 2 2# 2x 2# 2x 2# 2x 2# 2x   Pulley  5' 5' 5' 5' 5' 5'   TB ER/IR   GTB x 20 GTB x20 GTB 20x GTB 20x GTB 20x   Body blade m/l a/p   3x :20 20"x3 20"x3 20"x3 20"x3   Wall push ups       2x10   UBE       NV   Supine dynamic stabilization    15"x3 15"x3 15" x3 15"x3                                           Modalities          CP   10' 10'

## 2018-08-05 ENCOUNTER — HOSPITAL ENCOUNTER (EMERGENCY)
Facility: HOSPITAL | Age: 28
Discharge: HOME | End: 2018-08-05
Attending: EMERGENCY MEDICINE | Admitting: EMERGENCY MEDICINE
Payer: COMMERCIAL

## 2018-08-05 VITALS
HEIGHT: 65 IN | WEIGHT: 155 LBS | TEMPERATURE: 98.7 F | RESPIRATION RATE: 18 BRPM | OXYGEN SATURATION: 99 % | BODY MASS INDEX: 25.83 KG/M2 | DIASTOLIC BLOOD PRESSURE: 75 MMHG | HEART RATE: 83 BPM | SYSTOLIC BLOOD PRESSURE: 110 MMHG

## 2018-08-05 DIAGNOSIS — E86.0 DEHYDRATION: ICD-10-CM

## 2018-08-05 DIAGNOSIS — R55 PRE-SYNCOPE: Primary | ICD-10-CM

## 2018-08-05 LAB
BACTERIA UR QL AUTO: ABNORMAL /HPF
BILIRUB UR QL STRIP: NEGATIVE
CLARITY UR: CLEAR
COLOR UR: YELLOW
COLOR, POC: NORMAL
EXT PREG TEST URINE: NEGATIVE
GLUCOSE UR STRIP-MCNC: NEGATIVE MG/DL
HGB UR QL STRIP.AUTO: ABNORMAL
HYALINE CASTS #/AREA URNS LPF: ABNORMAL /LPF
KETONES UR STRIP-MCNC: NEGATIVE MG/DL
LEUKOCYTE ESTERASE UR QL STRIP: ABNORMAL
NITRITE UR QL STRIP: NEGATIVE
NON-SQ EPI CELLS URNS QL MICRO: ABNORMAL /HPF
PH UR STRIP.AUTO: 6.5 [PH] (ref 4.5–8)
PROT UR STRIP-MCNC: ABNORMAL MG/DL
RBC #/AREA URNS AUTO: ABNORMAL /HPF
SP GR UR STRIP.AUTO: 1.02 (ref 1–1.03)
UROBILINOGEN UR QL STRIP.AUTO: 0.2 E.U./DL
WBC #/AREA URNS AUTO: ABNORMAL /HPF

## 2018-08-05 PROCEDURE — 81001 URINALYSIS AUTO W/SCOPE: CPT

## 2018-08-05 PROCEDURE — 81025 URINE PREGNANCY TEST: CPT | Performed by: EMERGENCY MEDICINE

## 2018-08-05 PROCEDURE — 99284 EMERGENCY DEPT VISIT MOD MDM: CPT

## 2018-08-05 PROCEDURE — 87086 URINE CULTURE/COLONY COUNT: CPT

## 2018-08-05 NOTE — ED ATTENDING ATTESTATION
Nica Olivares MD, saw and evaluated the patient  I have discussed the patient with the resident/non-physician practitioner and agree with the resident's/non-physician practitioner's findings, Plan of Care, and MDM as documented in the resident's/non-physician practitioner's note, except where noted  All available labs and Radiology studies were reviewed  At this point I agree with the current assessment done in the Emergency Department  I have conducted an independent evaluation of this patient a history and physical is as follows:   The patient presents with a presyncopal episode while she was at a music that she was out in the heat she was walking around she started to become lightheaded she became somewhat nauseous she did not actually pass out she was helped to a seat she has no complaints of headache, no complaints of chest pain or shortness of breath no palpitations no abdominal pain no  diarrhea no blood in the stool  EXAM:   Const:   well appearing   NAD     HEENT:  NCAT    sclera anicteric conjunctiva pink   throat clear, MMM    Neck:   supple  no meningismus  no jvd   no bruits  no  midline tenderness   Lungs:   clear  CW non-tender   No creiptation  Heart:   RRR no m/g/r  Normal pulses  Abd:   soft nt nd pos bs   Ext:    normal nontender  No edema  Neruo:   CN 2 -12 intact  motor intact 5/5 sensory intact cerebellar intact       Gait normal    IMPRESSION:  Presyncope  PLAN:  Pre-hospital EKG normal sinus rhythm with normal intervals  Patient hydrated with a L of normal saline she will have urine and urine preg      Critical Care Time  CritCare Time    Procedures

## 2018-08-05 NOTE — ED PROVIDER NOTES
History  Chief Complaint   Patient presents with    Syncope     Patient had near syncopal event at [facility], became dizzy and lightheaded but did not pass out, patient admits she did not eat or drink much today except for one mug of beer       Sleep 47-year-old female with previous medical history of anxiety  Patient presents today after a near syncopal event  Patient was at  [address] was walking around  At that point she felt like she was going to pass out  Patient felt lightheaded , diaphoretic, short of breath  Patient sat down and an ambulance was called  Patient was brought in by EMS  Patient did not fall to the ground  Patient notes that she had has had no PO  Intake today other than 1 large glass of beer  Patient's last menstrual period was 2 weeks ago  Patient notes that her last menstrual period was somewhat light  Patient denies feeling the room spinning, numbness, tingling, weakness, focal neurological deficits  Patient has no cardiovascular history  Syncope   Episode history:  Single  Most recent episode: Today  Timing: once  Chronicity:  New  Witnessed: yes    Worsened by:  Posture  Ineffective treatments:  None tried  Associated symptoms: no chest pain, no dizziness, no fever, no nausea, no palpitations, no shortness of breath, no vomiting and no weakness    Risk factors: no congenital heart disease and no coronary artery disease        Prior to Admission Medications   Prescriptions Last Dose Informant Patient Reported? Taking? Norethin-Eth Estradiol-Fe (GENERESS FE PO) 8/5/2018 at Unknown time  Yes Yes   Sig: Take 1 tablet by mouth daily      Facility-Administered Medications: None       Past Medical History:   Diagnosis Date    Kidney stones        Past Surgical History:   Procedure Laterality Date    LITHOTRIPSY  2011    MOUTH SURGERY         History reviewed  No pertinent family history  I have reviewed and agree with the history as documented      Social History Substance Use Topics    Smoking status: Never Smoker    Smokeless tobacco: Never Used    Alcohol use 1 2 oz/week     2 Cans of beer per week      Comment: socially        Review of Systems   Constitutional: Negative for chills and fever  HENT: Negative for ear pain, rhinorrhea and sore throat  Eyes: Negative for photophobia and pain  Respiratory: Negative for cough, chest tightness and shortness of breath  Cardiovascular: Positive for syncope  Negative for chest pain, palpitations and leg swelling  Gastrointestinal: Negative for abdominal pain, blood in stool, constipation, diarrhea, nausea and vomiting  Endocrine: Negative for polyuria  Genitourinary: Negative for dysuria, frequency, hematuria and urgency  Musculoskeletal: Negative for arthralgias  Skin: Negative for rash  Neurological: Negative for dizziness, weakness and numbness  Psychiatric/Behavioral: The patient is not nervous/anxious  All other systems reviewed and are negative  Physical Exam  ED Triage Vitals [08/05/18 1437]   Temperature Pulse Respirations Blood Pressure SpO2   98 7 °F (37 1 °C) 93 18 104/65 99 %      Temp Source Heart Rate Source Patient Position - Orthostatic VS BP Location FiO2 (%)   Oral Monitor Sitting Left arm --      Pain Score       No Pain           Orthostatic Vital Signs  Vitals:    08/05/18 1437 08/05/18 1639   BP: 104/65 110/75   Pulse: 93 83   Patient Position - Orthostatic VS: Sitting Lying       Physical Exam   Constitutional: She appears well-developed and well-nourished  No distress  HENT:   Head: Normocephalic and atraumatic  Right Ear: External ear normal    Left Ear: External ear normal    Eyes: Conjunctivae and EOM are normal    Neck: No JVD present  No tracheal deviation present  Cardiovascular: Normal rate, regular rhythm, normal heart sounds and intact distal pulses  No murmur heard  Pulmonary/Chest: Breath sounds normal  No stridor  No respiratory distress   She has no wheezes  She has no rales  Abdominal: Soft  Bowel sounds are normal  She exhibits no distension and no mass  There is no tenderness  There is no rebound and no guarding  Genitourinary:   Genitourinary Comments: Deferred   Musculoskeletal: She exhibits no edema, tenderness or deformity  Neurological: No sensory deficit  She exhibits normal muscle tone  Coordination normal    Cranial nerves 2-12 intact  Patient intact to light sensation in upper and lower extremities bilaterally  Patient has MSK strength 5/5 upper extremities and lower extremities  Patient has a negative pronator drift  Patient has a negative heel to shin exam bilaterally  Visual fields intact bilaterally  Gait without abnormalities   Skin: Skin is warm and dry  Capillary refill takes less than 2 seconds  No rash noted  She is not diaphoretic  No erythema  No pallor  Psychiatric: She has a normal mood and affect  Her behavior is normal  Judgment and thought content normal        ED Medications  Medications - No data to display    Diagnostic Studies  Results Reviewed     Procedure Component Value Units Date/Time    Urine Microscopic [[id number]]  (Abnormal) Collected:  08/05/18 1616    Lab Status:  Final result Specimen:  Urine from Urine, Clean Catch Updated:  08/05/18 1639     RBC, UA 2-4 (A) /hpf      WBC, UA 30-50 (A) /hpf      Epithelial Cells Moderate (A) /hpf      Bacteria, UA Moderate (A) /hpf      Hyaline Casts, UA 5-10 (A) /lpf     Urine culture [[id number]] Collected:  08/05/18 1616    Lab Status:   In process Specimen:  Urine from Urine, Clean Catch Updated:  08/05/18 1639    POCT urinalysis dipstick [[id number]]  (Normal) Resulted:  08/05/18 1607    Lab Status:  Final result Updated:  08/05/18 1607     Color, UA done    POCT pregnancy, urine [[id number]]  (Normal) Resulted:  08/05/18 1607    Lab Status:  Final result Updated:  08/05/18 1607     EXT PREG TEST UR (Ref: Negative) NEGATIVE    ED Urine Macroscopic [[id number]]  (Abnormal) Collected:  08/05/18 1616    Lab Status:  Final result Specimen:  Urine Updated:  08/05/18 1605     Color, UA Yellow     Clarity, UA Clear     pH, UA 6 5     Leukocytes, UA Small (A)     Nitrite, UA Negative     Protein, UA Trace (A) mg/dl      Glucose, UA Negative mg/dl      Ketones, UA Negative mg/dl      Urobilinogen, UA 0 2 E U /dl      Bilirubin, UA Negative     Blood, UA Trace (A)     Specific [place], UA 1 020    Narrative:       CLINITEK RESULT                 No orders to display         Procedures  Procedures      Phone Consults  ED Phone Contact    ED Course                               MDM  Number of Diagnoses or Management Options  Dehydration: new and requires workup  Pre-syncope: new and requires workup  Diagnosis management comments:  17-year-old female with a presyncopal event  This appears likely due to decreased p o  Intake, exhaustion from walking around in the heat, and alcohol use  Will check urine pregnancy test as well as a urine test to assess her volume status  Physical exam was benign without neurological deficits that would lead me to believe there to be another cause of her pre-syncopal event  EMS ECG normal  I don't believe this is cardiac related    Will give patient PO challenge, check urine, and plan on discharge if all are normal  UA is a dirty catch with multiple epithelial cells  Patient has no symptoms of UTI so she will not be treated  UA was ordered for judgement of dehydration  Appears not significantly dehydrated by UA  Patient tolerating fluids well  Finished 1L NS  Patient ambulates well  Patient has appointment with PCP on Monday  Patient told to increase PO intake and avoid heat/ exercise and to come back if she has an episode of syncope         Amount and/or Complexity of Data Reviewed  Clinical lab tests: ordered and reviewed  Independent visualization of images, tracings, or specimens: yes    Risk of Complications, Morbidity, and/or Mortality  Presenting problems: low  Diagnostic procedures: low      CritCare Time    Disposition  Final diagnoses:   Pre-syncope   Dehydration     Time reflects when diagnosis was documented in both MDM as applicable and the Disposition within this note     Time User Action Codes Description Comment    8/5/2018  3:22 PM Heather Loza Add [R55] Pre-syncope     8/5/2018  4:30 PM Leny Mcmahon Add [E86 0] Dehydration       ED Disposition     ED Disposition Condition Comment    Discharge  [address] Ang discharge to home/self care  Condition at discharge: Good        Follow-up Information     Follow up With Specialties Details Why Contact Info    Vijay Jolly DO Family Medicine In 1 week If symptoms worsen [address] [facility] [address] [facility]   [phone]            Patient's Medications   Discharge Prescriptions    No medications on file     No discharge procedures on file  ED Provider  Attending physically available and evaluated [address] Ang I managed the patient along with the ED Attending      Electronically Signed by         Steve Kim DO  08/05/18 7275

## 2018-08-05 NOTE — DISCHARGE INSTRUCTIONS
Please continue to drink fluids  Please come back to the ED if you are unable to keep down fluids, you pass out, or note any bleeding  Please follow up with your PCP if you have continued feelings like you are going to pass out  Syncope   WHAT YOU NEED TO KNOW:   Syncope is also called fainting or passing out  Syncope is a sudden, temporary loss of consciousness, followed by a fall from a standing or sitting position  Syncope ranges from not serious to a sign of a more serious condition that needs to be treated  You can control some health conditions that cause syncope  Your healthcare providers can help you create a plan to manage syncope and prevent episodes  DISCHARGE INSTRUCTIONS:   Seek care immediately if:   · You are bleeding because you hit your head when you fainted  · You suddenly have double vision, difficulty speaking, numbness, and cannot move your arms or legs  · You have chest pain and trouble breathing  · You vomit blood or material that looks like coffee grounds  · You see blood in your bowel movement  Contact your healthcare provider if:   · You have new or worsening symptoms  · You have another syncope episode  · You have a headache, fast heartbeat, or feel too dizzy to stand up  · You have questions or concerns about your condition or care  Follow up with your healthcare provider as directed:  Write down your questions so you remember to ask them during your visits  Manage syncope:   · Keep a record of your syncope episodes  Include your symptoms and your activity before and after the episode  The record can help your healthcare provider find the cause of your syncope and help you manage episodes  · Sit or lie down when needed  This includes when you feel dizzy, your throat is getting tight, and your vision changes  Raise your legs above the level of your heart  · Take slow, deep breaths if you start to breathe faster with anxiety or fear    This can help decrease dizziness and the feeling that you might faint  · Check your blood pressure often  This is important if you take medicine to lower your blood pressure  Check your blood pressure when you are lying down and when you are standing  Ask how often to check during the day  Keep a record of your blood pressure numbers  Your healthcare provider may use the record to help plan your treatment  Prevent a syncope episode:   · Move slowly and let yourself get used to one position before you move to another position  This is very important when you change from a lying or sitting position to a standing position  Take some deep breaths before you stand up from a lying position  Stand up slowly  Sudden movements may cause a fainting spell  Sit on the side of the bed or couch for a few minutes before you stand up  If you are on bedrest, try to be upright for about 2 hours each day, or as directed  Do not lock your legs if you are standing for a long period of time  Move your legs and bend your knees to keep blood flowing  · Follow your healthcare provider's recommendations  Your provider may  recommend that you drink more liquids to prevent dehydration  You may also need to have more salt to keep your blood pressure from dropping too low and causing syncope  Your provider will tell you how much liquid and sodium to have each day  · Watch for signs of low blood sugar  These include hunger, nervousness, sweating, and fast or fluttery heartbeats  Talk with your healthcare provider about ways to keep your blood sugar level steady  · Do not strain if you are constipated  You may faint if you strain to have a bowel movement  Walking is the best way to get your bowels moving  Eat foods high in fiber to make it easier to have a bowel movement  Good examples are high-fiber cereals, beans, vegetables, and whole-grain breads  Prune juice may help make bowel movements softer  · Be careful in hot weather  Heat can cause a syncope episode  Limit activity done outside on hot days  Physical activity in hot weather can lead to dehydration  This can cause an episode  © 2017 [employer]  Information is for End User's use only and may not be sold, redistributed or otherwise used for commercial purposes  All illustrations and images included in CareNotes® are the copyrighted property of A D A M , Inc  or Jose Raines  The above information is an  only  It is not intended as medical advice for individual conditions or treatments  Talk to your doctor, nurse or pharmacist before following any medical regimen to see if it is safe and effective for you

## 2018-08-05 NOTE — ED NOTES
Patient reports improvement of symptoms after consuming water and crackers       Jimena Rubalcava, [place]  08/05/18 6079

## 2018-08-06 LAB — BACTERIA UR CULT: NORMAL

## 2018-08-07 ENCOUNTER — OFFICE VISIT (OUTPATIENT)
Dept: PHYSICAL THERAPY | Facility: CLINIC | Age: 28
End: 2018-08-07
Payer: COMMERCIAL

## 2018-08-07 DIAGNOSIS — M25.512 ACUTE PAIN OF LEFT SHOULDER: Primary | ICD-10-CM

## 2018-08-07 PROCEDURE — 97110 THERAPEUTIC EXERCISES: CPT | Performed by: PHYSICAL THERAPIST

## 2018-08-07 PROCEDURE — 97112 NEUROMUSCULAR REEDUCATION: CPT | Performed by: PHYSICAL THERAPIST

## 2018-08-07 PROCEDURE — 97140 MANUAL THERAPY 1/> REGIONS: CPT | Performed by: PHYSICAL THERAPIST

## 2018-08-07 NOTE — PROGRESS NOTES
Daily Note     Today's date: 2018  Patient name: Douglas Current  : 1990  MRN: [id number]  Referring provider: Candi Allen, PT  Dx:   Encounter Diagnosis     ICD-10-CM    1  Acute pain of left shoulder M25 512                   Subjective: Pt notes that she is feeling good today  She denies any shoulder pain  Objective: See treatment diary below      Assessment: Tolerated treatment well  Patient demonstrated fatigue post treatment, exhibited good technique with therapeutic exercises and would benefit from continued PT  She was challenged with table push ups  Added additional OH strengthening with good tolerance  Will plan to have patient trial Mark again in the next week and assess response  Plan: Continue per plan of care  Progress treatment as tolerated          Precautions:  none    Manuals          Inf/lat GH mobs 5'         STM L upper trap 5'                                        Exercise Diary          Spider dona RTB 10x         Scap Squeezes HEP         Sleeper Str HEP         TB Rows BTB 20x         TB Shld Ext BTB 20x         SL ER 2# 20x         Prone Row/Ret 2# 20x         Prone Shld Ext 2# 20x         Prone Hoiz Abd 2# 20x         Doorway Pec Str HEP         Serratus Alphabet 2# 2x         Pulley 5'         TB ER/IR GTB 20x         Body blade m/l a/p 20"x3 ea         Wall push ups Table 10x         UBE 3' retro         Supine dynamic stabilization 15"x3                                                 Modalities          CP

## 2018-08-09 ENCOUNTER — APPOINTMENT (OUTPATIENT)
Dept: PHYSICAL THERAPY | Facility: CLINIC | Age: 28
End: 2018-08-09
Payer: COMMERCIAL

## 2018-08-16 ENCOUNTER — APPOINTMENT (OUTPATIENT)
Dept: PHYSICAL THERAPY | Facility: CLINIC | Age: 28
End: 2018-08-16
Payer: COMMERCIAL

## 2018-08-21 ENCOUNTER — OFFICE VISIT (OUTPATIENT)
Dept: PHYSICAL THERAPY | Facility: CLINIC | Age: 28
End: 2018-08-21
Payer: COMMERCIAL

## 2018-08-21 DIAGNOSIS — M25.512 ACUTE PAIN OF LEFT SHOULDER: Primary | ICD-10-CM

## 2018-08-21 PROCEDURE — G8990 OTHER PT/OT CURRENT STATUS: HCPCS | Performed by: PHYSICAL THERAPIST

## 2018-08-21 PROCEDURE — 97110 THERAPEUTIC EXERCISES: CPT

## 2018-08-21 PROCEDURE — G8992 OTHER PT/OT  D/C STATUS: HCPCS | Performed by: PHYSICAL THERAPIST

## 2018-08-21 PROCEDURE — 97112 NEUROMUSCULAR REEDUCATION: CPT

## 2018-08-21 PROCEDURE — G8991 OTHER PT/OT GOAL STATUS: HCPCS | Performed by: PHYSICAL THERAPIST

## 2018-08-21 NOTE — PROGRESS NOTES
Daily Note     Today's date: 2018  Patient name: Mariel Shanks  : 1990  MRN: [id number]  Referring provider: Aramis Holden PT  Dx:   Encounter Diagnosis     ICD-10-CM    1  Acute pain of left shoulder M25 512                   Subjective: Patient states that she no longer feels paraesthesia in LUE and pain overall has been less with daily activity  Objective: See treatment diary below  Precautions:  none    Manuals         Inf/lat GH mobs 5' np PTA avail  STM L upper trap 5' 5                                       Exercise Diary          Spider dona RTB 10x RTB x 10         Scap Squeezes HEP HEP        Sleeper Str HEP HEP        TB Rows BTB 20x BTB x 20         TB Shld Ext BTB 20x BTB x 20         SL ER 2# 20x 2# x 20         Prone Row/Ret 2# 20x 2# x 20         Prone Shld Ext 2# 20x 2# x 20         Prone Hoiz Abd 2# 20x 2# x 20         Doorway Pec Str HEP         Serratus Alphabet 2# 2x 2#x 20        Pulley 5' 5'        TB ER/IR GTB 20x GTB x 20         Body blade m/l a/p 20"x3 ea :20 x 3        Wall push ups Table 10x counter x 10        UBE 3' retro 4 retro        Supine dynamic stabilization 15"x3 15" x 3                                                Modalities          CP  10                       Assessment: Tolerated treatment well  Patient exhibited good technique with therapeutic exercises      Plan: Continue per plan of care

## 2018-09-05 NOTE — PROGRESS NOTES
Pt called to cancel all future appointments due to resolution of her symptoms  She will be discharged from skilled care at this time

## 2019-05-10 ENCOUNTER — ANNUAL EXAM (OUTPATIENT)
Dept: OBGYN CLINIC | Facility: CLINIC | Age: 29
End: 2019-05-10
Payer: COMMERCIAL

## 2019-05-10 VITALS
BODY MASS INDEX: 26.49 KG/M2 | DIASTOLIC BLOOD PRESSURE: 70 MMHG | WEIGHT: 159 LBS | SYSTOLIC BLOOD PRESSURE: 122 MMHG | HEIGHT: 65 IN

## 2019-05-10 DIAGNOSIS — Z30.41 SURVEILLANCE FOR BIRTH CONTROL, ORAL CONTRACEPTIVES: ICD-10-CM

## 2019-05-10 DIAGNOSIS — Z01.419 ENCOUNTER FOR WELL WOMAN EXAM: Primary | ICD-10-CM

## 2019-05-10 DIAGNOSIS — Z11.51 SCREENING FOR HPV (HUMAN PAPILLOMAVIRUS): ICD-10-CM

## 2019-05-10 DIAGNOSIS — Z12.4 ROUTINE CERVICAL SMEAR: ICD-10-CM

## 2019-05-10 PROCEDURE — S0610 ANNUAL GYNECOLOGICAL EXAMINA: HCPCS | Performed by: PHYSICIAN ASSISTANT

## 2019-05-10 RX ORDER — CITALOPRAM 10 MG/1
TABLET ORAL
COMMUNITY
Start: 2019-04-30

## 2019-05-10 RX ORDER — FLUTICASONE PROPIONATE 50 MCG
2 SPRAY, SUSPENSION (ML) NASAL DAILY
COMMUNITY
Start: 2018-08-10

## 2019-05-10 RX ORDER — NORETHINDRONE AND ETHINYL ESTRADIOL AND FERROUS FUMARATE 0.8-25(24)
1 KIT ORAL DAILY
Qty: 30 TABLET | Refills: 11 | Status: SHIPPED | OUTPATIENT
Start: 2019-05-10 | End: 2020-04-30 | Stop reason: SDUPTHER

## 2019-05-10 RX ORDER — CALCIUM CITRATE/VITAMIN D3 200MG-6.25
2 TABLET ORAL DAILY
COMMUNITY

## 2019-05-22 LAB
HPV HR 12 DNA CVX QL NAA+PROBE: DETECTED
HPV16 DNA SPEC QL NAA+PROBE: NOT DETECTED
HPV18 DNA SPEC QL NAA+PROBE: NOT DETECTED
THIN PREP CVX: ABNORMAL

## 2019-08-29 ENCOUNTER — OFFICE VISIT (OUTPATIENT)
Dept: URGENT CARE | Facility: CLINIC | Age: 29
End: 2019-08-29
Payer: COMMERCIAL

## 2019-08-29 VITALS
RESPIRATION RATE: 18 BRPM | BODY MASS INDEX: 26.66 KG/M2 | WEIGHT: 160 LBS | OXYGEN SATURATION: 97 % | DIASTOLIC BLOOD PRESSURE: 68 MMHG | TEMPERATURE: 97.8 F | SYSTOLIC BLOOD PRESSURE: 100 MMHG | HEIGHT: 65 IN | HEART RATE: 76 BPM

## 2019-08-29 DIAGNOSIS — R35.0 URINARY FREQUENCY: Primary | ICD-10-CM

## 2019-08-29 LAB
SL AMB  POCT GLUCOSE, UA: NEGATIVE
SL AMB LEUKOCYTE ESTERASE,UA: NEGATIVE
SL AMB POCT BILIRUBIN,UA: NEGATIVE
SL AMB POCT BLOOD,UA: NEGATIVE
SL AMB POCT CLARITY,UA: CLEAR
SL AMB POCT COLOR,UA: YELLOW
SL AMB POCT KETONES,UA: NEGATIVE
SL AMB POCT NITRITE,UA: NEGATIVE
SL AMB POCT PH,UA: 6
SL AMB POCT SPECIFIC GRAVITY,UA: 1.01
SL AMB POCT URINE PROTEIN: NEGATIVE
SL AMB POCT UROBILINOGEN: 0.2

## 2019-08-29 PROCEDURE — 87086 URINE CULTURE/COLONY COUNT: CPT | Performed by: PHYSICIAN ASSISTANT

## 2019-08-29 PROCEDURE — 99213 OFFICE O/P EST LOW 20 MIN: CPT | Performed by: PHYSICIAN ASSISTANT

## 2019-08-29 PROCEDURE — 81002 URINALYSIS NONAUTO W/O SCOPE: CPT | Performed by: PHYSICIAN ASSISTANT

## 2019-08-29 NOTE — PROGRESS NOTES
[facility]        NAME: Douglas Current is a 25 y o  female  : 1990    MRN: [id number]  DATE: 2019  TIME: 6:51 PM    Assessment and Plan   Urinary frequency [R35 0]  1  Urinary frequency  Urine culture    POCT urine dip     Urine dip neg; will follow up with culture    Patient Instructions     Follow up with PCP in 3-5 days  Proceed to  ER if symptoms worsen  Chief Complaint     Chief Complaint   Patient presents with    Possible UTI     urine frequency and R sided pelvic pain since yesterday  hx of kidney stones  History of Present Illness       70-year-old female presents for evaluation of urinary frequency and flank pain  Patient complains of urinary frequency that started yesterday  Patient also complaining of pain in the right lower back radiating to the right lower abdomen since yesterday  Patient does have a history of kidney stones  Patient has no history of pyelonephritis  She denies hematuria  Denies fever chills  Denies nausea or vomiting  Review of Systems   Review of Systems   Constitutional: Negative for chills, fatigue and fever  HENT: Negative for congestion, ear pain, sinus pain, sore throat and trouble swallowing  Eyes: Negative for pain, discharge and redness  Respiratory: Negative for cough, chest tightness, shortness of breath and wheezing  Cardiovascular: Negative for chest pain, palpitations and leg swelling  Gastrointestinal: Negative for abdominal pain, diarrhea, nausea and vomiting  Genitourinary: Positive for flank pain and frequency  Negative for dysuria, hematuria, vaginal bleeding, vaginal discharge and vaginal pain  Musculoskeletal: Negative for arthralgias, joint swelling and myalgias  Skin: Negative for rash  Neurological: Negative for dizziness, weakness, numbness and headaches           Current Medications       Current Outpatient Medications:     Cholecalciferol (VITAMIN D3) 2000 units CHEW, Chew 2 tablets daily, Disp: , Rfl:     citalopram (CeleXA) 10 mg tablet, , Disp: , Rfl:     fluticasone (FLONASE) 50 mcg/act nasal spray, 2 sprays into each nostril daily, Disp: , Rfl:     Multiple Vitamins-Minerals (MULTIVITAMIN GUMMIES WOMENS) CHEW, Chew 2 tablets daily, Disp: , Rfl:     Norethin-Eth Estradiol-Fe (GENERESS FE) 0 8-25 MG-MCG CHEW, Chew 1 tablet daily for 30 days, Disp: 30 tablet, Rfl: 11    Current Allergies     Allergies as of 08/29/2019 - Reviewed 08/29/2019   Allergen Reaction Noted    Morphine Rash 12/29/2016            The following portions of the patient's history were reviewed and updated as appropriate: allergies, current medications, past family history, past medical history, past social history, past surgical history and problem list      Past Medical History:   Diagnosis Date    Kidney stones     LGSIL on Pap smear of cervix 2013       Past Surgical History:   Procedure Laterality Date    LITHOTRIPSY  2011    MOUTH SURGERY         No family history on file  Medications have been verified  Objective   /68 (BP Location: Left arm, Patient Position: Sitting)   Pulse 76   Temp 97 8 °F (36 6 °C) (Tympanic)   Resp 18   Ht 5' 5" (1 651 m)   Wt 72 6 kg (160 lb)   LMP 08/17/2019   SpO2 97%   BMI 26 63 kg/m²        Physical Exam     Physical Exam   Constitutional: Vital signs are normal  She appears well-developed and well-nourished  No distress  Cardiovascular: Normal rate, regular rhythm and intact distal pulses  Pulmonary/Chest: Effort normal and breath sounds normal    Abdominal: Soft  Normal appearance and bowel sounds are normal  There is no tenderness  There is no rigidity, no rebound, no guarding, no CVA tenderness, no tenderness at McBurney's point and negative Snow's sign  No hernia  Skin: Skin is warm, dry and intact  Nursing note and vitals reviewed

## 2019-08-30 LAB — BACTERIA UR CULT: ABNORMAL

## 2019-09-02 ENCOUNTER — TELEPHONE (OUTPATIENT)
Dept: URGENT CARE | Facility: CLINIC | Age: 29
End: 2019-09-02

## 2019-09-02 DIAGNOSIS — N39.0 URINARY TRACT INFECTION WITHOUT HEMATURIA, SITE UNSPECIFIED: Primary | ICD-10-CM

## 2019-09-02 RX ORDER — AMOXICILLIN 875 MG/1
875 TABLET, COATED ORAL 2 TIMES DAILY
Qty: 14 TABLET | Refills: 0 | Status: SHIPPED | OUTPATIENT
Start: 2019-09-02 | End: 2019-09-09

## 2019-09-02 NOTE — TELEPHONE ENCOUNTER
Discussed final urine culture results with patient  There is positive bacteria growth  Will send antibiotic to [facility] [place] as per patient  No known allergies

## 2020-04-26 DIAGNOSIS — Z30.9 CONTRACEPTIVE MANAGEMENT: Primary | ICD-10-CM

## 2020-04-27 RX ORDER — NORETHINDRONE AND ETHINYL ESTRADIOL AND FERROUS FUMARATE 0.8-25(24)
1 KIT ORAL DAILY
Qty: 30 TABLET | Refills: 1 | Status: SHIPPED | OUTPATIENT
Start: 2020-04-27

## 2020-04-30 ENCOUNTER — ANNUAL EXAM (OUTPATIENT)
Dept: OBGYN CLINIC | Facility: CLINIC | Age: 30
End: 2020-04-30
Payer: COMMERCIAL

## 2020-04-30 VITALS
WEIGHT: 162 LBS | DIASTOLIC BLOOD PRESSURE: 80 MMHG | HEIGHT: 65 IN | BODY MASS INDEX: 26.99 KG/M2 | SYSTOLIC BLOOD PRESSURE: 120 MMHG

## 2020-04-30 DIAGNOSIS — Z12.39 ENCOUNTER FOR SCREENING BREAST EXAMINATION: ICD-10-CM

## 2020-04-30 DIAGNOSIS — R87.810 CERVICAL HIGH RISK HPV (HUMAN PAPILLOMAVIRUS) TEST POSITIVE: ICD-10-CM

## 2020-04-30 DIAGNOSIS — Z01.419 CERVICAL SMEAR, AS PART OF ROUTINE GYNECOLOGICAL EXAMINATION: ICD-10-CM

## 2020-04-30 DIAGNOSIS — Z30.41 SURVEILLANCE FOR BIRTH CONTROL, ORAL CONTRACEPTIVES: ICD-10-CM

## 2020-04-30 DIAGNOSIS — Z01.419 ENCOUNTER FOR WELL WOMAN EXAM: Primary | ICD-10-CM

## 2020-04-30 PROCEDURE — S0612 ANNUAL GYNECOLOGICAL EXAMINA: HCPCS | Performed by: PHYSICIAN ASSISTANT

## 2020-04-30 RX ORDER — NORETHINDRONE AND ETHINYL ESTRADIOL AND FERROUS FUMARATE 0.8-25(24)
1 KIT ORAL DAILY
Qty: 30 TABLET | Refills: 11 | Status: SHIPPED | OUTPATIENT
Start: 2020-04-30 | End: 2021-01-06 | Stop reason: SDUPTHER

## 2020-05-06 LAB — THIN PREP CVX: ABNORMAL

## 2020-11-13 ENCOUNTER — OFFICE VISIT (OUTPATIENT)
Dept: OBGYN CLINIC | Facility: CLINIC | Age: 30
End: 2020-11-13
Payer: COMMERCIAL

## 2020-11-13 VITALS
HEIGHT: 65 IN | BODY MASS INDEX: 27.49 KG/M2 | WEIGHT: 165 LBS | DIASTOLIC BLOOD PRESSURE: 80 MMHG | SYSTOLIC BLOOD PRESSURE: 130 MMHG

## 2020-11-13 DIAGNOSIS — R87.612 LOW GRADE SQUAMOUS INTRAEPITH LESION ON CYTOLOGIC SMEAR CERVIX (LGSIL): ICD-10-CM

## 2020-11-13 DIAGNOSIS — Z12.4 ENCOUNTER FOR REPEAT PAPANICOLAOU SMEAR OF CERVIX: Primary | ICD-10-CM

## 2020-11-13 PROCEDURE — 99213 OFFICE O/P EST LOW 20 MIN: CPT | Performed by: PHYSICIAN ASSISTANT

## 2020-11-17 LAB — THIN PREP CVX: NORMAL

## 2020-11-30 ENCOUNTER — OFFICE VISIT (OUTPATIENT)
Dept: URGENT CARE | Facility: MEDICAL CENTER | Age: 30
End: 2020-11-30
Payer: COMMERCIAL

## 2020-11-30 VITALS
HEIGHT: 65 IN | BODY MASS INDEX: 26.66 KG/M2 | OXYGEN SATURATION: 100 % | HEART RATE: 99 BPM | TEMPERATURE: 97.8 F | RESPIRATION RATE: 16 BRPM | WEIGHT: 160 LBS

## 2020-11-30 DIAGNOSIS — B34.9 ACUTE VIRAL SYNDROME: Primary | ICD-10-CM

## 2020-11-30 LAB — S PYO AG THROAT QL: NEGATIVE

## 2020-11-30 PROCEDURE — 87637 SARSCOV2&INF A&B&RSV AMP PRB: CPT | Performed by: PHYSICIAN ASSISTANT

## 2020-11-30 PROCEDURE — 87880 STREP A ASSAY W/OPTIC: CPT | Performed by: PHYSICIAN ASSISTANT

## 2020-11-30 PROCEDURE — 99213 OFFICE O/P EST LOW 20 MIN: CPT | Performed by: PHYSICIAN ASSISTANT

## 2020-12-02 LAB
FLUAV RNA NPH QL NAA+PROBE: NOT DETECTED
FLUBV RNA NPH QL NAA+PROBE: NOT DETECTED
RSV RNA NPH QL NAA+PROBE: NOT DETECTED
SARS-COV-2 RNA NPH QL NAA+PROBE: NOT DETECTED

## 2021-01-04 ENCOUNTER — IMMUNIZATIONS (OUTPATIENT)
Dept: FAMILY MEDICINE CLINIC | Facility: HOSPITAL | Age: 31
End: 2021-01-04

## 2021-01-04 DIAGNOSIS — Z23 ENCOUNTER FOR IMMUNIZATION: ICD-10-CM

## 2021-01-04 PROCEDURE — 0011A SARS-COV-2 / COVID-19 MRNA VACCINE (MODERNA) 100 MCG: CPT

## 2021-01-04 PROCEDURE — 91301 SARS-COV-2 / COVID-19 MRNA VACCINE (MODERNA) 100 MCG: CPT

## 2021-01-06 DIAGNOSIS — Z30.41 SURVEILLANCE FOR BIRTH CONTROL, ORAL CONTRACEPTIVES: ICD-10-CM

## 2021-01-06 RX ORDER — NORETHINDRONE AND ETHINYL ESTRADIOL AND FERROUS FUMARATE 0.8-25(24)
1 KIT ORAL DAILY
Qty: 30 TABLET | Refills: 3 | Status: SHIPPED | OUTPATIENT
Start: 2021-01-06 | End: 2021-04-23 | Stop reason: SDUPTHER

## 2021-02-08 ENCOUNTER — IMMUNIZATIONS (OUTPATIENT)
Dept: FAMILY MEDICINE CLINIC | Facility: HOSPITAL | Age: 31
End: 2021-02-08

## 2021-02-08 DIAGNOSIS — Z23 ENCOUNTER FOR IMMUNIZATION: Primary | ICD-10-CM

## 2021-02-08 PROCEDURE — 0012A SARS-COV-2 / COVID-19 MRNA VACCINE (MODERNA) 100 MCG: CPT

## 2021-02-08 PROCEDURE — 91301 SARS-COV-2 / COVID-19 MRNA VACCINE (MODERNA) 100 MCG: CPT

## 2021-04-23 ENCOUNTER — ANNUAL EXAM (OUTPATIENT)
Dept: OBGYN CLINIC | Facility: CLINIC | Age: 31
End: 2021-04-23
Payer: COMMERCIAL

## 2021-04-23 VITALS
BODY MASS INDEX: 25.9 KG/M2 | DIASTOLIC BLOOD PRESSURE: 80 MMHG | SYSTOLIC BLOOD PRESSURE: 130 MMHG | WEIGHT: 165 LBS | HEIGHT: 67 IN

## 2021-04-23 DIAGNOSIS — Z12.39 ENCOUNTER FOR SCREENING BREAST EXAMINATION: ICD-10-CM

## 2021-04-23 DIAGNOSIS — Z30.41 SURVEILLANCE FOR BIRTH CONTROL, ORAL CONTRACEPTIVES: ICD-10-CM

## 2021-04-23 DIAGNOSIS — Z01.419 ENCOUNTER FOR WELL WOMAN EXAM: Primary | ICD-10-CM

## 2021-04-23 DIAGNOSIS — R87.612 LOW GRADE SQUAMOUS INTRAEPITH LESION ON CYTOLOGIC SMEAR CERVIX (LGSIL): ICD-10-CM

## 2021-04-23 PROCEDURE — S0612 ANNUAL GYNECOLOGICAL EXAMINA: HCPCS | Performed by: PHYSICIAN ASSISTANT

## 2021-04-23 RX ORDER — LANOLIN ALCOHOL/MO/W.PET/CERES
1000 CREAM (GRAM) TOPICAL DAILY
COMMUNITY

## 2021-04-23 RX ORDER — NORETHINDRONE AND ETHINYL ESTRADIOL AND FERROUS FUMARATE 0.8-25(24)
1 KIT ORAL DAILY
Qty: 30 TABLET | Refills: 12 | Status: SHIPPED | OUTPATIENT
Start: 2021-04-23 | End: 2021-07-20 | Stop reason: SDUPTHER

## 2021-04-23 NOTE — PROGRESS NOTES
Assessment/Plan:    No problem-specific Assessment & Plan notes found for this encounter  Diagnoses and all orders for this visit:    Encounter for well woman exam    Encounter for screening breast examination    Surveillance for birth control, oral contraceptives  -     Norethin-Eth Estradiol-Fe (Generess FE) 0 8-25 MG-MCG CHEW; Chew 1 tablet daily    Low grade squamous intraepith lesion on cytologic smear cervix (lgsil)  -     GP PAP (RFLX HPV Plus whenASC-US)    Other orders  -     vitamin B-12 (VITAMIN B-12) 1,000 mcg tablet; Take 1,000 mcg by mouth daily          Subjective:      Patient ID: Maxine Jaime is a 26 y o  female  Pt presents for her annual exam today--  She has no complaints except some increase HAs Jan--Feb---seem better now! She has regular bleeding, no pelvic pain  Bowel and bladder are regular  No breast concerns today      pap today  rx generess fe  Daily mvi      The following portions of the patient's history were reviewed and updated as appropriate: allergies, current medications, past family history, past medical history, past social history, past surgical history and problem list     Review of Systems   Constitutional: Negative for chills, fever and unexpected weight change  Gastrointestinal: Negative for abdominal pain, blood in stool, constipation and diarrhea  Genitourinary: Negative  Objective:      /80   Ht 5' 6 93" (1 7 m)   Wt 74 8 kg (165 lb)   LMP 03/28/2021 (Exact Date)   BMI 25 90 kg/m²          Physical Exam  Vitals signs and nursing note reviewed  Constitutional:       Appearance: She is well-developed  HENT:      Head: Normocephalic and atraumatic  Neck:      Musculoskeletal: Normal range of motion  Chest:      Breasts:         Right: No inverted nipple, mass, nipple discharge or skin change  Left: No inverted nipple, mass, nipple discharge or skin change  Abdominal:      Palpations: Abdomen is soft     Genitourinary: Exam position: Supine  Labia:         Right: No rash, tenderness or lesion  Left: No rash, tenderness or lesion  Vagina: Normal       Cervix: No cervical motion tenderness, discharge or friability  Adnexa:         Right: No mass, tenderness or fullness  Left: No mass, tenderness or fullness  Lymphadenopathy:      Lower Body: No right inguinal adenopathy  No left inguinal adenopathy

## 2021-04-23 NOTE — PROGRESS NOTES
The patient is here for a yearly  The patient is due for a pap smear  pap normal 11/15/20,  pap LSIL 4/30/20, Pap normal HPV+ 5/10/19, pap normal 4/11/17, pap normal 3/9/16  The patient has regular periods  No vaginal, bowel, bladder or breast problems

## 2021-04-28 LAB — THIN PREP CVX: NORMAL

## 2021-07-20 DIAGNOSIS — Z30.41 SURVEILLANCE FOR BIRTH CONTROL, ORAL CONTRACEPTIVES: ICD-10-CM

## 2021-07-20 RX ORDER — NORETHINDRONE AND ETHINYL ESTRADIOL AND FERROUS FUMARATE 0.8-25(24)
1 KIT ORAL DAILY
Qty: 90 TABLET | Refills: 2 | Status: SHIPPED | OUTPATIENT
Start: 2021-07-20 | End: 2022-04-19

## 2022-04-16 DIAGNOSIS — Z30.41 SURVEILLANCE FOR BIRTH CONTROL, ORAL CONTRACEPTIVES: ICD-10-CM

## 2022-04-19 RX ORDER — NORETHINDRONE AND ETHINYL ESTRADIOL AND FERROUS FUMARATE 0.8-25(24)
KIT ORAL
Qty: 84 TABLET | Refills: 0 | Status: SHIPPED | OUTPATIENT
Start: 2022-04-19

## 2022-04-29 ENCOUNTER — ANNUAL EXAM (OUTPATIENT)
Dept: OBGYN CLINIC | Facility: CLINIC | Age: 32
End: 2022-04-29
Payer: COMMERCIAL

## 2022-04-29 VITALS
WEIGHT: 167 LBS | DIASTOLIC BLOOD PRESSURE: 80 MMHG | BODY MASS INDEX: 27.82 KG/M2 | SYSTOLIC BLOOD PRESSURE: 116 MMHG | HEIGHT: 65 IN

## 2022-04-29 DIAGNOSIS — Z12.39 ENCOUNTER FOR SCREENING BREAST EXAMINATION: ICD-10-CM

## 2022-04-29 DIAGNOSIS — Z30.41 SURVEILLANCE FOR BIRTH CONTROL, ORAL CONTRACEPTIVES: ICD-10-CM

## 2022-04-29 DIAGNOSIS — Z01.419 ENCOUNTER FOR WELL WOMAN EXAM: Primary | ICD-10-CM

## 2022-04-29 PROCEDURE — S0612 ANNUAL GYNECOLOGICAL EXAMINA: HCPCS | Performed by: PHYSICIAN ASSISTANT

## 2022-04-29 RX ORDER — NORETHINDRONE AND ETHINYL ESTRADIOL AND FERROUS FUMARATE 0.8-25(24)
1 KIT ORAL DAILY
Qty: 90 TABLET | Refills: 3 | Status: SHIPPED | OUTPATIENT
Start: 2022-04-29 | End: 2022-07-28

## 2022-04-29 NOTE — PROGRESS NOTES
The patient is here for a yearly  Pap normal 4/23/21, pap normal 11/13/20, pap LSIL 4/30/20, pap normal HPV pos 5/10/19

## 2022-04-29 NOTE — PROGRESS NOTES
Assessment/Plan:    No problem-specific Assessment & Plan notes found for this encounter  Diagnoses and all orders for this visit:    Encounter for well woman exam    Encounter for screening breast examination    Surveillance for birth control, oral contraceptives  -     Norethin-Eth Estradiol-Fe 0 8-25 MG-MCG CHEW; Chew 1 tablet daily          Subjective:      Patient ID: Tatum Connor is a 33 y o  female  Pt presents for her annual exam today--  She has no complaints except some PMS  She has light bleeding   No pelvic pain  On Generess  Bowel and bladder are regular  No breast concerns today      No pap today  rx generess fe  Daily B and D      The following portions of the patient's history were reviewed and updated as appropriate: allergies, current medications, past family history, past medical history, past social history, past surgical history and problem list     Review of Systems   Constitutional: Negative for chills, fever and unexpected weight change  Gastrointestinal: Negative for abdominal pain, blood in stool, constipation and diarrhea  Genitourinary: Negative  Objective:      /80   Ht 5' 5" (1 651 m)   Wt 75 8 kg (167 lb)   LMP 04/24/2022 (Exact Date)   BMI 27 79 kg/m²          Physical Exam  Vitals and nursing note reviewed  Constitutional:       Appearance: She is well-developed  HENT:      Head: Normocephalic and atraumatic  Chest:   Breasts:      Right: No inverted nipple, mass, nipple discharge or skin change  Left: No inverted nipple, mass, nipple discharge or skin change  Abdominal:      Palpations: Abdomen is soft  Genitourinary:     Exam position: Supine  Labia:         Right: No rash, tenderness or lesion  Left: No rash, tenderness or lesion  Vagina: Normal       Cervix: No cervical motion tenderness, discharge or friability  Adnexa:         Right: No mass, tenderness or fullness            Left: No mass, tenderness or fullness  Musculoskeletal:      Cervical back: Normal range of motion  Lymphadenopathy:      Lower Body: No right inguinal adenopathy  No left inguinal adenopathy

## 2022-08-11 ENCOUNTER — OFFICE VISIT (OUTPATIENT)
Dept: URGENT CARE | Facility: CLINIC | Age: 32
End: 2022-08-11

## 2022-08-11 VITALS
OXYGEN SATURATION: 99 % | DIASTOLIC BLOOD PRESSURE: 105 MMHG | HEART RATE: 70 BPM | TEMPERATURE: 99 F | SYSTOLIC BLOOD PRESSURE: 166 MMHG | RESPIRATION RATE: 18 BRPM

## 2022-08-11 DIAGNOSIS — R30.0 DYSURIA: Primary | ICD-10-CM

## 2022-08-11 DIAGNOSIS — N39.0 URINARY TRACT INFECTION WITHOUT HEMATURIA, SITE UNSPECIFIED: ICD-10-CM

## 2022-08-11 LAB
SL AMB  POCT GLUCOSE, UA: ABNORMAL
SL AMB LEUKOCYTE ESTERASE,UA: ABNORMAL
SL AMB POCT BILIRUBIN,UA: ABNORMAL
SL AMB POCT BLOOD,UA: ABNORMAL
SL AMB POCT CLARITY,UA: ABNORMAL
SL AMB POCT COLOR,UA: CLEAR
SL AMB POCT KETONES,UA: ABNORMAL
SL AMB POCT NITRITE,UA: ABNORMAL
SL AMB POCT PH,UA: 6
SL AMB POCT SPECIFIC GRAVITY,UA: 1.01
SL AMB POCT URINE PROTEIN: ABNORMAL
SL AMB POCT UROBILINOGEN: 0.2

## 2022-08-11 PROCEDURE — 87086 URINE CULTURE/COLONY COUNT: CPT

## 2022-08-11 PROCEDURE — 81002 URINALYSIS NONAUTO W/O SCOPE: CPT

## 2022-08-11 PROCEDURE — G0382 LEV 3 HOSP TYPE B ED VISIT: HCPCS

## 2022-08-11 RX ORDER — CEPHALEXIN 500 MG/1
500 CAPSULE ORAL EVERY 12 HOURS SCHEDULED
Qty: 14 CAPSULE | Refills: 0 | Status: SHIPPED | COMMUNITY
Start: 2022-08-11 | End: 2022-08-18

## 2022-08-11 RX ORDER — CEPHALEXIN 500 MG/1
500 CAPSULE ORAL EVERY 12 HOURS SCHEDULED
Qty: 14 CAPSULE | Refills: 0 | Status: SHIPPED | OUTPATIENT
Start: 2022-08-11 | End: 2022-08-11 | Stop reason: CLARIF

## 2022-08-11 NOTE — PATIENT INSTRUCTIONS
Urine dip positive  Will send for culture  Check my chart for urine culture results  Start antibiotic and take as directed  Take probiotic  Drink plenty of fluids, water or cranberry juice  Avoid caffeine and alcohol  Tylenol or Motrin for pain or fever  Azo for bladder spasms  Follow up with PCP in 3-5 days  If you develop fever, flank pain, vomiting, abdominal pain, or any new or concerning symptoms please return or proceed to ER  Urinary Tract Infection in Women   WHAT YOU NEED TO KNOW:   A urinary tract infection (UTI) is caused by bacteria that get inside your urinary tract  Most bacteria that enter your urinary tract come out when you urinate  If the bacteria stay in your urinary tract, you may get an infection  Your urinary tract includes your kidneys, ureters, bladder, and urethra  Urine is made in your kidneys, and it flows from the ureters to the bladder  Urine leaves the bladder through the urethra  A UTI is more common in your lower urinary tract, which includes your bladder and urethra  DISCHARGE INSTRUCTIONS:   Return to the emergency department if:   You are urinating very little or not at all  You have a high fever with shaking chills  You have side or back pain that gets worse  Contact your healthcare provider if:   You have a fever  You do not feel better after 2 days of taking antibiotics  You are vomiting  You have questions or concerns about your condition or care  Medicines:   Antibiotics  help fight a bacterial infection  Medicines  may be given to decrease pain and burning when you urinate  They will also help decrease the feeling that you need to urinate often  These medicines will make your urine orange or red  Take your medicine as directed  Contact your healthcare provider if you think your medicine is not helping or if you have side effects  Tell him or her if you are allergic to any medicine   Keep a list of the medicines, vitamins, and herbs you take  Include the amounts, and when and why you take them  Bring the list or the pill bottles to follow-up visits  Carry your medicine list with you in case of an emergency  Follow up with your healthcare provider as directed:  Write down your questions so you remember to ask them during your visits  Prevent another UTI:   Empty your bladder often  Urinate and empty your bladder as soon as you feel the need  Do not hold your urine for long periods of time  Wipe from front to back after you urinate or have a bowel movement  This will help prevent germs from getting into your urinary tract through your urethra  Drink liquids as directed  Ask how much liquid to drink each day and which liquids are best for you  You may need to drink more liquids than usual to help flush out the bacteria  Do not drink alcohol, caffeine, or citrus juices  These can irritate your bladder and increase your symptoms  Your healthcare provider may recommend cranberry juice to help prevent a UTI  Urinate after you have sex  This can help flush out bacteria passed during sex  Do not douche or use feminine deodorants  These can change the chemical balance in your vagina  Change sanitary pads or tampons often  This will help prevent germs from getting into your urinary tract  Do pelvic muscle exercises often  Pelvic muscle exercises may help you start and stop urinating  Strong pelvic muscles may help you empty your bladder easier  Squeeze these muscles tightly for 5 seconds like you are trying to hold back urine  Then relax for 5 seconds  Gradually work up to squeezing for 10 seconds  Do 3 sets of 15 repetitions a day, or as directed  © 2017 [employer] Information is for End User's use only and may not be sold, redistributed or otherwise used for commercial purposes   All illustrations and images included in CareNotes® are the copyrighted property of [email or web address]  or [employer]  The above information is an  only  It is not intended as medical advice for individual conditions or treatments  Talk to your doctor, nurse or pharmacist before following any medical regimen to see if it is safe and effective for you

## 2022-08-11 NOTE — PROGRESS NOTES
[facility]        NAME: Renetta Rodarte is a 33 y o  female  : 1990    MRN: [id number]  DATE: 2022  TIME: 7:58 PM    Assessment and Plan   Dysuria [R30 0]  1  Dysuria  POCT urine dip    Urine culture   2  Urinary tract infection without hematuria, site unspecified  cephalexin (KEFLEX) 500 mg capsule    DISCONTINUED: cephalexin (KEFLEX) 500 mg capsule     POCT urine dip positive for leuks  Will send for culture and start on keflex  Await results  Patient Instructions     Urine dip positive  Will send for culture  Check my chart for urine culture results  Start antibiotic and take as directed  Take probiotic  Drink plenty of fluids, water or cranberry juice  Avoid caffeine and alcohol  Tylenol or Motrin for pain or fever  Azo for bladder spasms  Follow up with PCP in 3-5 days  If you develop fever, flank pain, vomiting, abdominal pain, or any new or concerning symptoms please return or proceed to ER  Chief Complaint     Chief Complaint   Patient presents with    Abdominal Pain     Patient says she has a history of kidney stones         History of Present Illness       The patient presents today with complaints of suprapubic abdominal pain, chills, urinary frequency, urgency, and R sided flank pain that started yesterday  She has a history of kidney stones  She denies hematuria, or fevers  Review of Systems   Review of Systems   Constitutional: Positive for chills  Negative for fatigue and fever  HENT: Negative for congestion  Eyes: Negative  Respiratory: Negative for cough and shortness of breath  Cardiovascular: Negative for chest pain and palpitations  Gastrointestinal: Negative for abdominal pain, diarrhea, nausea and vomiting  Genitourinary: Positive for flank pain (r sided), frequency, pelvic pain (suprapubic) and urgency  Negative for difficulty urinating  Musculoskeletal: Negative for myalgias  Skin: Negative for rash  Allergic/Immunologic: Negative for environmental allergies  Neurological: Negative for dizziness and headaches  Psychiatric/Behavioral: Negative  Current Medications       Current Outpatient Medications:     cephalexin (KEFLEX) 500 mg capsule, Take 1 capsule (500 mg total) by mouth every 12 (twelve) hours for 7 days, Disp: 14 capsule, Rfl: 0    Ascorbic Acid (VITAMIN C PO), Take 1 tablet by mouth daily, Disp: , Rfl:     Cholecalciferol (VITAMIN D3) 2000 units CHEW, Chew 2 tablets daily, Disp: , Rfl:     citalopram (CeleXA) 10 mg tablet, , Disp: , Rfl:     fluticasone (FLONASE) 50 mcg/act nasal spray, 2 sprays into each nostril daily, Disp: , Rfl:     Multiple Vitamins-Minerals (MULTIVITAMIN GUMMIES WOMENS) CHEW, Chew 2 tablets daily, Disp: , Rfl:     Norethin-Eth Estradiol-Fe 0 8-25 MG-MCG CHEW, Chew 1 tablet daily, Disp: 30 tablet, Rfl: 1    Norethin-Eth Estradiol-Fe 0 8-25 MG-MCG CHEW, CHEW ONE TABLET DAILY, Disp: 84 tablet, Rfl: 0    Norethin-Eth Estradiol-Fe 0 8-25 MG-MCG CHEW, Chew 1 tablet daily, Disp: 90 tablet, Rfl: 3    vitamin B-12 (VITAMIN B-12) 1,000 mcg tablet, Take 1,000 mcg by mouth daily, Disp: , Rfl:     Current Allergies     Allergies as of 08/11/2022 - Reviewed 08/11/2022   Allergen Reaction Noted    Morphine Rash 12/29/2016            The following portions of the patient's history were reviewed and updated as appropriate: allergies, current medications, past family history, past medical history, past social history, past surgical history and problem list      Past Medical History:   Diagnosis Date    High cholesterol     Kidney stones     LGSIL on Pap smear of cervix 2013       Past Surgical History:   Procedure Laterality Date    LITHOTRIPSY  2011    MOUTH SURGERY         History reviewed  No pertinent family history  Medications have been verified          Objective   BP (!) 166/105   Pulse 70   Temp 99 °F (37 2 °C)   Resp 18   SpO2 99%        Physical Exam     Physical Exam  Vitals and nursing note reviewed  Constitutional:       General: She is not in acute distress  Appearance: Normal appearance  She is not ill-appearing  HENT:      Head: Normocephalic and atraumatic  Right Ear: External ear normal       Left Ear: External ear normal       Nose: Nose normal       Mouth/Throat:      Lips: Pink  Mouth: Mucous membranes are moist       Pharynx: Oropharynx is clear  Eyes:      General: Vision grossly intact  Extraocular Movements: Extraocular movements intact  Pupils: Pupils are equal, round, and reactive to light  Cardiovascular:      Rate and Rhythm: Normal rate and regular rhythm  Heart sounds: Normal heart sounds  No murmur heard  Pulmonary:      Effort: Pulmonary effort is normal       Breath sounds: Normal breath sounds  Abdominal:      General: Abdomen is flat  Bowel sounds are normal       Palpations: Abdomen is soft  Tenderness: There is abdominal tenderness in the suprapubic area  There is no right CVA tenderness or left CVA tenderness  Musculoskeletal:         General: Normal range of motion  Cervical back: Normal range of motion  Skin:     General: Skin is warm  Findings: No rash  Neurological:      Mental Status: She is alert and oriented to person, place, and time  Motor: Motor function is intact     Psychiatric:         Attention and Perception: Attention normal          Mood and Affect: Mood normal

## 2022-08-12 LAB — BACTERIA UR CULT: NORMAL

## 2022-08-16 ENCOUNTER — OFFICE VISIT (OUTPATIENT)
Dept: FAMILY MEDICINE CLINIC | Facility: CLINIC | Age: 32
End: 2022-08-16

## 2022-08-16 VITALS
TEMPERATURE: 98.3 F | HEIGHT: 65 IN | BODY MASS INDEX: 27.16 KG/M2 | SYSTOLIC BLOOD PRESSURE: 160 MMHG | RESPIRATION RATE: 18 BRPM | OXYGEN SATURATION: 99 % | HEART RATE: 103 BPM | DIASTOLIC BLOOD PRESSURE: 90 MMHG | WEIGHT: 163 LBS

## 2022-08-16 DIAGNOSIS — F33.41 RECURRENT MAJOR DEPRESSIVE DISORDER, IN PARTIAL REMISSION (HCC): Primary | ICD-10-CM

## 2022-08-16 DIAGNOSIS — I10 ESSENTIAL HYPERTENSION: ICD-10-CM

## 2022-08-16 DIAGNOSIS — F41.9 ANXIETY: ICD-10-CM

## 2022-08-16 DIAGNOSIS — E78.2 MIXED HYPERLIPIDEMIA: ICD-10-CM

## 2022-08-16 PROBLEM — E55.9 VITAMIN D DEFICIENCY: Status: ACTIVE | Noted: 2021-04-11

## 2022-08-16 PROBLEM — E78.5 HYPERLIPIDEMIA: Status: ACTIVE | Noted: 2021-04-11

## 2022-08-16 PROBLEM — F43.23 ADJUSTMENT DISORDER WITH MIXED ANXIETY AND DEPRESSED MOOD: Status: ACTIVE | Noted: 2018-08-31

## 2022-08-16 PROCEDURE — 99203 OFFICE O/P NEW LOW 30 MIN: CPT | Performed by: NURSE PRACTITIONER

## 2022-08-16 RX ORDER — LANOLIN ALCOHOL/MO/W.PET/CERES
CREAM (GRAM) TOPICAL
COMMUNITY
Start: 2021-03-10

## 2022-08-16 RX ORDER — PROPRANOLOL HYDROCHLORIDE 10 MG/1
10 TABLET ORAL 3 TIMES DAILY
Qty: 90 TABLET | Refills: 0 | Status: SHIPPED | OUTPATIENT
Start: 2022-08-16 | End: 2022-08-23 | Stop reason: ALTCHOICE

## 2022-08-16 NOTE — ASSESSMENT & PLAN NOTE
Low-salt diet, cardiovascular activity  Is starting propranolol to help with anxiety  This may also help lower blood pressure  Continue checking blood pressures at home    Follow-up in 4-6 weeks

## 2022-08-16 NOTE — PROGRESS NOTES
BMI Counseling: Body mass index is 27 12 kg/m²  The BMI is above normal  Nutrition recommendations include reducing portion sizes  Exercise recommendations include moderate aerobic physical activity for 150 minutes/week  OFFICE VISIT  Dai Leblanc 33 y o  female MRN: [id number]    BMI Counseling: Body mass index is 27 12 kg/m²  The BMI is above normal  Nutrition recommendations include decreasing portion sizes  Exercise recommendations include moderate physical activity 150 minutes/week  Rationale for BMI follow-up plan is due to patient being overweight or obese  Depression Screening and Follow-up Plan: Patient was screened for depression during today's encounter  They screened negative with a PHQ-2 score of 0  Assessment / Plan:  Problem List Items Addressed This Visit        Cardiovascular and Mediastinum    Essential hypertension     Low-salt diet, cardiovascular activity  Is starting propranolol to help with anxiety  This may also help lower blood pressure  Continue checking blood pressures at home  Follow-up in 4-6 weeks         Relevant Medications    propranolol (INDERAL) 10 mg tablet       Other    Anxiety     Continue with Celexa at 10 mg  Continue with therapy  Adding in propanolol          Relevant Medications    propranolol (INDERAL) 10 mg tablet    Hyperlipidemia - Primary     Heart healthy diet         Recurrent major depressive disorder, in partial [facility]     Stable on current regimen                 Reason For Visit / Chief Complaint  Chief Complaint   Patient presents with   [address] [place] [address] patient  Have been told she has hypertension and bad anxiety have been seeing a therapist          HPI:  Dai Leblanc is a 33 y o  female who presents today to [facility] care  She has known anxiety  She is on celexa 10mg daily  She reports her anxiety has been high over the last few weeks  Especially since this weekend  She is established with therapy   She reports feeling tense, sob  Historical Information   Past Medical History:   Diagnosis Date    High cholesterol     Kidney stones     LGSIL on Pap smear of cervix 2013     Past Surgical History:   Procedure Laterality Date    LITHOTRIPSY  2011    MOUTH SURGERY       Social History   Social History     Substance and Sexual Activity   Alcohol Use Yes    Alcohol/week: 2 0 standard drinks    Types: 2 Cans of beer per week    Comment: socially     Social History     Substance and Sexual Activity   Drug Use Not Currently    Types: Marijuana     Social History     Tobacco Use   Smoking Status Never Smoker   Smokeless Tobacco Never Used     History reviewed  No pertinent family history      Meds/Allergies   Allergies   Allergen Reactions    Morphine Rash       Meds:    Current Outpatient Medications:     Ascorbic Acid (VITAMIN C PO), Take 1 tablet by mouth daily, Disp: , Rfl:     cephalexin (KEFLEX) 500 mg capsule, Take 1 capsule (500 mg total) by mouth every 12 (twelve) hours for 7 days, Disp: 14 capsule, Rfl: 0    Cholecalciferol (VITAMIN D3) 2000 units CHEW, Chew 2 tablets daily, Disp: , Rfl:     citalopram (CeleXA) 10 mg tablet, , Disp: , Rfl:     fluticasone (FLONASE) 50 mcg/act nasal spray, 2 sprays into each nostril daily, Disp: , Rfl:     Multiple Vitamins-Minerals (MULTIVITAMIN GUMMIES WOMENS) CHEW, Chew 2 tablets daily, Disp: , Rfl:     Norethin-Eth Estradiol-Fe 0 8-25 MG-MCG CHEW, Chew 1 tablet daily, Disp: 30 tablet, Rfl: 1    propranolol (INDERAL) 10 mg tablet, Take 1 tablet (10 mg total) by mouth 3 (three) times a day, Disp: 90 tablet, Rfl: 0    vitamin B-12 (VITAMIN B-12) 1,000 mcg tablet, Take 1,000 mcg by mouth daily, Disp: , Rfl:     vitamin B-12 (VITAMIN B-12) 1,000 mcg tablet, , Disp: , Rfl:     Norethin-Eth Estradiol-Fe 0 8-25 MG-MCG CHEW, CHEW ONE TABLET DAILY, Disp: 84 tablet, Rfl: 0    Norethin-Eth Estradiol-Fe 0 8-25 MG-MCG CHEW, Chew 1 tablet daily, Disp: 90 tablet, Rfl: 3      REVIEW OF SYSTEMS  Review of Systems   Constitutional: Negative for activity change, chills, fatigue and fever  HENT: Negative for congestion, ear discharge, ear pain, sinus pressure, sinus pain, sore throat, tinnitus and trouble swallowing  Eyes: Negative for photophobia, pain, discharge, itching and visual disturbance  Respiratory: Positive for shortness of breath  Negative for cough, chest tightness and wheezing  Cardiovascular: Positive for chest pain and palpitations  Negative for leg swelling  Gastrointestinal: Negative for abdominal distention, abdominal pain, constipation, diarrhea, nausea and vomiting  Endocrine: Negative for polydipsia, polyphagia and polyuria  Genitourinary: Negative for dysuria and frequency  Musculoskeletal: Negative for arthralgias, myalgias, neck pain and neck stiffness  Skin: Negative for color change  Neurological: Positive for headaches  Negative for dizziness, syncope, weakness and numbness  Hematological: Does not bruise/bleed easily  Psychiatric/Behavioral: Negative for behavioral problems, confusion, self-injury, sleep disturbance and suicidal ideas  The patient is nervous/anxious  Current Vitals:   Blood Pressure: 160/90 (08/16/22 0713)  Pulse: 103 (08/16/22 0713)  Temperature: 98 3 °F (36 8 °C) (08/16/22 0713)  Respirations: 18 (08/16/22 0713)  Height: 5' 5" (165 1 cm) (08/16/22 0713)  Weight - Scale: 73 9 kg (163 lb) (08/16/22 0713)  SpO2: 99 % (08/16/22 0713)  [unfilled]    PHYSICAL EXAMS:  Physical Exam  Vitals and nursing note reviewed  Constitutional:       General: She is not in acute distress  Appearance: Normal appearance  She is well-developed  HENT:      Head: Normocephalic and atraumatic  Eyes:      Conjunctiva/sclera: Conjunctivae normal    Cardiovascular:      Rate and Rhythm: Normal rate and regular rhythm  Pulses: Normal pulses  Heart sounds: Normal heart sounds  No murmur heard    Pulmonary:      Effort: Pulmonary effort is normal  No respiratory distress  Breath sounds: Normal breath sounds  Abdominal:      Palpations: Abdomen is soft  Tenderness: There is no abdominal tenderness  Musculoskeletal:      Cervical back: Neck supple  Skin:     General: Skin is warm and dry  Neurological:      General: No focal deficit present  Mental Status: She is alert and oriented to person, place, and time  Psychiatric:         Mood and Affect: Mood normal          Behavior: Behavior normal              Lab, imaging and other studies: I have personally reviewed pertinent reports  Srinivasa Manifold

## 2022-08-19 ENCOUNTER — OFFICE VISIT (OUTPATIENT)
Dept: FAMILY MEDICINE CLINIC | Facility: CLINIC | Age: 32
End: 2022-08-19

## 2022-08-19 VITALS
OXYGEN SATURATION: 100 % | RESPIRATION RATE: 18 BRPM | SYSTOLIC BLOOD PRESSURE: 150 MMHG | DIASTOLIC BLOOD PRESSURE: 80 MMHG | BODY MASS INDEX: 26.62 KG/M2 | HEIGHT: 65 IN | HEART RATE: 85 BPM | TEMPERATURE: 98.5 F | WEIGHT: 159.8 LBS

## 2022-08-19 DIAGNOSIS — I10 ESSENTIAL HYPERTENSION: Primary | ICD-10-CM

## 2022-08-19 DIAGNOSIS — R42 VERTIGO: ICD-10-CM

## 2022-08-19 DIAGNOSIS — F41.9 ANXIETY: ICD-10-CM

## 2022-08-19 PROCEDURE — 99214 OFFICE O/P EST MOD 30 MIN: CPT | Performed by: NURSE PRACTITIONER

## 2022-08-19 NOTE — LETTER
August 19, 2022     Patient: Mary Hayes  YOB: 1990  Date of Visit: 8/19/2022      To Whom it May Concern:    Kitty Anderson is under my professional care  Invernesssyed Sharmaley was seen in my office on 8/19/2022  Jassisyed August may return to work on 6 18 18  If you have any questions or concerns, please don't hesitate to call           Sincerely,          SWETHA Burnham        CC: No Recipients

## 2022-08-19 NOTE — ASSESSMENT & PLAN NOTE
By history, has taken meclizine in the past with side effects  Hold on medication at this time  Dizziness most likely related to elevated blood pressure

## 2022-08-19 NOTE — PROGRESS NOTES
OFFICE VISIT  Fer Nieto 33 y o  female MRN: [id number]          Assessment / Plan:  Problem List Items Addressed This Visit        Cardiovascular and Mediastinum    Essential hypertension - Primary     She will start a propanolol today 3 times daily  She will spot check her blood pressure over the weekend  She will have a follow-up on Monday here office  Low-salt diet  Other    Anxiety     Continue taking Celexa 10 mg daily  Try to find activities to reduce stress         Vertigo     By history, has taken meclizine in the past with side effects  Hold on medication at this time  Dizziness most likely related to elevated blood pressure  Reason For Visit / Chief Complaint  Chief Complaint   Patient presents with    Chills     Monday      Earache     Tuesday evening and dizzy  Wed dizzy  Thursday dizzy head ache right ear pain and pressure        HPI:  Fer Nieto is a 33 y o  female who presents today for right ear, she reports using flonase with some relief, she reports right ear pain and off balance  She also dizziness  She was found to have new HTN     Historical Information   Past Medical History:   Diagnosis Date    High cholesterol     Kidney stones     LGSIL on Pap smear of cervix 2013     Past Surgical History:   Procedure Laterality Date    LITHOTRIPSY  2011    MOUTH SURGERY       Social History   Social History     Substance and Sexual Activity   Alcohol Use Yes    Alcohol/week: 2 0 standard drinks    Types: 2 Cans of beer per week    Comment: socially     Social History     Substance and Sexual Activity   Drug Use Not Currently    Types: Marijuana     Social History     Tobacco Use   Smoking Status Never Smoker   Smokeless Tobacco Never Used     History reviewed  No pertinent family history      Meds/Allergies   Allergies   Allergen Reactions    Morphine Rash       Meds:    Current Outpatient Medications:     Ascorbic Acid (VITAMIN C PO), Take 1 tablet by mouth daily, Disp: , Rfl:     Cholecalciferol (VITAMIN D3) 2000 units CHEW, Chew 2 tablets daily, Disp: , Rfl:     citalopram (CeleXA) 10 mg tablet, , Disp: , Rfl:     fluticasone (FLONASE) 50 mcg/act nasal spray, 2 sprays into each nostril daily, Disp: , Rfl:     Multiple Vitamins-Minerals (MULTIVITAMIN GUMMIES WOMENS) CHEW, Chew 2 tablets daily, Disp: , Rfl:     Norethin-Eth Estradiol-Fe 0 8-25 MG-MCG CHEW, Chew 1 tablet daily, Disp: 30 tablet, Rfl: 1    Norethin-Eth Estradiol-Fe 0 8-25 MG-MCG CHEW, CHEW ONE TABLET DAILY, Disp: 84 tablet, Rfl: 0    vitamin B-12 (VITAMIN B-12) 1,000 mcg tablet, Take 1,000 mcg by mouth daily, Disp: , Rfl:     vitamin B-12 (VITAMIN B-12) 1,000 mcg tablet, , Disp: , Rfl:     Norethin-Eth Estradiol-Fe 0 8-25 MG-MCG CHEW, Chew 1 tablet daily, Disp: 90 tablet, Rfl: 3    propranolol (INDERAL) 10 mg tablet, Take 1 tablet (10 mg total) by mouth 3 (three) times a day (Patient not taking: Reported on 8/19/2022), Disp: 90 tablet, Rfl: 0      REVIEW OF SYSTEMS  Review of Systems   Constitutional: Negative for activity change, chills, fatigue and fever  HENT: Positive for ear pain  Negative for congestion, ear discharge, sinus pressure, sinus pain, sore throat, tinnitus and trouble swallowing  Eyes: Negative for photophobia, pain, discharge, itching and visual disturbance  Respiratory: Negative for cough, chest tightness, shortness of breath and wheezing  Cardiovascular: Negative for chest pain and leg swelling  Gastrointestinal: Negative for abdominal distention, abdominal pain, constipation, diarrhea, nausea and vomiting  Endocrine: Negative for polydipsia, polyphagia and polyuria  Genitourinary: Negative for dysuria and frequency  Musculoskeletal: Negative for arthralgias, myalgias, neck pain and neck stiffness  Skin: Negative for color change  Neurological: Positive for dizziness  Negative for syncope, weakness, numbness and headaches     Hematological: Does not bruise/bleed easily  Psychiatric/Behavioral: Negative for behavioral problems, confusion, self-injury, sleep disturbance and suicidal ideas  The patient is not nervous/anxious  Current Vitals:   Blood Pressure: 150/80 (08/19/22 0736)  Pulse: 85 (08/19/22 0736)  Temperature: 98 5 °F (36 9 °C) (08/19/22 0736)  Respirations: 18 (08/19/22 0736)  Height: 5' 5" (165 1 cm) (08/19/22 0736)  Weight - Scale: 72 5 kg (159 lb 12 8 oz) (08/19/22 0736)  SpO2: 100 % (08/19/22 0736)  [unfilled]    PHYSICAL EXAMS:  Physical Exam  Vitals and nursing note reviewed  Constitutional:       Appearance: Normal appearance  HENT:      Head: Normocephalic and atraumatic  Right Ear: Tympanic membrane normal       Left Ear: Tympanic membrane normal       Nose: Nose normal       Mouth/Throat:      Mouth: Mucous membranes are moist    Eyes:      Extraocular Movements: Extraocular movements intact  Pupils: Pupils are equal, round, and reactive to light  Cardiovascular:      Rate and Rhythm: Normal rate and regular rhythm  Pulmonary:      Effort: Pulmonary effort is normal       Breath sounds: Normal breath sounds  Abdominal:      General: Abdomen is flat  Musculoskeletal:         General: Normal range of motion  Cervical back: Normal range of motion  Skin:     General: Skin is warm  Neurological:      General: No focal deficit present  Mental Status: She is alert and oriented to person, place, and time  Psychiatric:         Mood and Affect: Mood normal          Behavior: Behavior normal              Lab, imaging and other studies: I have personally reviewed pertinent reports  Yuli Coles

## 2022-08-19 NOTE — ASSESSMENT & PLAN NOTE
She will start a propanolol today 3 times daily  She will spot check her blood pressure over the weekend  She will have a follow-up on Monday here office  Low-salt diet

## 2022-08-22 ENCOUNTER — OFFICE VISIT (OUTPATIENT)
Dept: FAMILY MEDICINE CLINIC | Facility: CLINIC | Age: 32
End: 2022-08-22

## 2022-08-22 VITALS
RESPIRATION RATE: 18 BRPM | OXYGEN SATURATION: 98 % | HEIGHT: 65 IN | HEART RATE: 75 BPM | BODY MASS INDEX: 26.56 KG/M2 | WEIGHT: 159.4 LBS | TEMPERATURE: 98.4 F | DIASTOLIC BLOOD PRESSURE: 76 MMHG | SYSTOLIC BLOOD PRESSURE: 136 MMHG

## 2022-08-22 DIAGNOSIS — R42 VERTIGO: Primary | ICD-10-CM

## 2022-08-22 DIAGNOSIS — F33.41 RECURRENT MAJOR DEPRESSIVE DISORDER, IN PARTIAL REMISSION (HCC): ICD-10-CM

## 2022-08-22 DIAGNOSIS — I10 ESSENTIAL HYPERTENSION: Primary | ICD-10-CM

## 2022-08-22 DIAGNOSIS — F41.9 ANXIETY: ICD-10-CM

## 2022-08-22 PROCEDURE — 99214 OFFICE O/P EST MOD 30 MIN: CPT | Performed by: NURSE PRACTITIONER

## 2022-08-22 RX ORDER — MECLIZINE HCL 12.5 MG/1
12.5 TABLET ORAL EVERY 12 HOURS PRN
Qty: 30 TABLET | Refills: 0 | Status: SHIPPED | OUTPATIENT
Start: 2022-08-22 | End: 2022-09-01

## 2022-08-22 NOTE — PROGRESS NOTES
OFFICE VISIT  Gurmeet Saxena 33 y o  female MRN: [id number]          Assessment / Plan:  Problem List Items Addressed This Visit        Cardiovascular and Mediastinum    Essential hypertension - Primary     Under better control at todays visit  Continue with low salt diet  Other    Anxiety     Continue propanolol at 10mg, can reduce to twice daily,stop bedtime dose  Feeling less anxious          Recurrent major depressive disorder, in partial remission (HCC)     currently on celexa at 10mg daily, overall symptoms stable                  Reason For Visit / Chief Complaint  Chief Complaint   Patient presents with    Follow-up     On medication        HPI:  Gurmeet Saxena is a 33 y o  female who presents today for follow up  She has started taking proloprnal to help with her anxiety and hth           Historical Information   Past Medical History:   Diagnosis Date    High cholesterol     Kidney stones     LGSIL on Pap smear of cervix 2013     Past Surgical History:   Procedure Laterality Date    LITHOTRIPSY  2011    MOUTH SURGERY       Social History   Social History     Substance and Sexual Activity   Alcohol Use Yes    Alcohol/week: 2 0 standard drinks    Types: 2 Cans of beer per week    Comment: socially     Social History     Substance and Sexual Activity   Drug Use Not Currently    Types: Marijuana     Social History     Tobacco Use   Smoking Status Never Smoker   Smokeless Tobacco Never Used     History reviewed  No pertinent family history      Meds/Allergies   Allergies   Allergen Reactions    Morphine Rash       Meds:    Current Outpatient Medications:     Ascorbic Acid (VITAMIN C PO), Take 1 tablet by mouth daily, Disp: , Rfl:     Cholecalciferol (VITAMIN D3) 2000 units CHEW, Chew 2 tablets daily, Disp: , Rfl:     citalopram (CeleXA) 10 mg tablet, , Disp: , Rfl:     fluticasone (FLONASE) 50 mcg/act nasal spray, 2 sprays into each nostril daily, Disp: , Rfl:     Multiple Vitamins-Minerals (MULTIVITAMIN GUMMIES WOMENS) CHEW, Chew 2 tablets daily, Disp: , Rfl:     Norethin-Eth Estradiol-Fe 0 8-25 MG-MCG CHEW, Chew 1 tablet daily, Disp: 30 tablet, Rfl: 1    Norethin-Eth Estradiol-Fe 0 8-25 MG-MCG CHEW, CHEW ONE TABLET DAILY, Disp: 84 tablet, Rfl: 0    propranolol (INDERAL) 10 mg tablet, Take 1 tablet (10 mg total) by mouth 3 (three) times a day, Disp: 90 tablet, Rfl: 0    vitamin B-12 (VITAMIN B-12) 1,000 mcg tablet, Take 1,000 mcg by mouth daily, Disp: , Rfl:     vitamin B-12 (VITAMIN B-12) 1,000 mcg tablet, , Disp: , Rfl:     Norethin-Eth Estradiol-Fe 0 8-25 MG-MCG CHEW, Chew 1 tablet daily, Disp: 90 tablet, Rfl: 3      REVIEW OF SYSTEMS  Review of Systems   Constitutional: Negative for activity change, chills, fatigue and fever  HENT: Negative for congestion, ear discharge, ear pain, sinus pressure, sinus pain, sore throat, tinnitus and trouble swallowing  Eyes: Negative for photophobia, pain, discharge, itching and visual disturbance  Respiratory: Negative for cough, chest tightness, shortness of breath and wheezing  Cardiovascular: Negative for chest pain and leg swelling  Gastrointestinal: Negative for abdominal distention, abdominal pain, constipation, diarrhea, nausea and vomiting  Endocrine: Negative for polydipsia, polyphagia and polyuria  Genitourinary: Negative for dysuria and frequency  Musculoskeletal: Negative for arthralgias, myalgias, neck pain and neck stiffness  Skin: Negative for color change  Neurological: Negative for dizziness, syncope, weakness, numbness and headaches  Hematological: Does not bruise/bleed easily  Psychiatric/Behavioral: Negative for behavioral problems, confusion, self-injury, sleep disturbance and suicidal ideas  The patient is not nervous/anxious              Current Vitals:   Blood Pressure: 136/76 (08/22/22 0738)  Pulse: 75 (08/22/22 0738)  Temperature: 98 4 °F (36 9 °C) (08/22/22 0738)  Respirations: 18 (08/22/22 0626)  Height: 5' 5" (165 1 cm) (08/22/22 0738)  Weight - Scale: 72 3 kg (159 lb 6 4 oz) (08/22/22 0738)  SpO2: 98 % (08/22/22 0738)  [unfilled]    PHYSICAL EXAMS:  Physical Exam  Vitals and nursing note reviewed  Constitutional:       General: She is not in acute distress  Appearance: Normal appearance  She is well-developed  HENT:      Head: Normocephalic and atraumatic  Eyes:      Conjunctiva/sclera: Conjunctivae normal    Cardiovascular:      Rate and Rhythm: Normal rate and regular rhythm  Pulses: Normal pulses  Heart sounds: Normal heart sounds  No murmur heard  Pulmonary:      Effort: Pulmonary effort is normal  No respiratory distress  Breath sounds: Normal breath sounds  Abdominal:      Palpations: Abdomen is soft  Tenderness: There is no abdominal tenderness  Musculoskeletal:         General: Normal range of motion  Cervical back: Neck supple  Skin:     General: Skin is warm and dry  Neurological:      General: No focal deficit present  Mental Status: She is alert and oriented to person, place, and time  Psychiatric:         Mood and Affect: Mood normal          Behavior: Behavior normal              Lab, imaging and other studies: I have personally reviewed pertinent reports  Kary Frausto

## 2022-08-22 NOTE — LETTER
August 22, 2022     Patient: Adalberto Spruce  YOB: 1990  Date of Visit: 8/22/2022      To Whom it May Concern:    Annemarie Sanchez is under my professional care  Delma Campoverde was seen in my office on 8/22/2022  Delma Campoverde may return to work on 6 18 18  If you have any questions or concerns, please don't hesitate to call           Sincerely,          SWETHA Cabrera        CC: No Recipients

## 2022-08-23 DIAGNOSIS — I10 ESSENTIAL HYPERTENSION: Primary | ICD-10-CM

## 2022-08-23 RX ORDER — HYDROCHLOROTHIAZIDE 12.5 MG/1
12.5 TABLET ORAL DAILY
Qty: 30 TABLET | Refills: 0 | Status: SHIPPED | OUTPATIENT
Start: 2022-08-23 | End: 2022-09-18

## 2022-08-25 ENCOUNTER — OFFICE VISIT (OUTPATIENT)
Dept: FAMILY MEDICINE CLINIC | Facility: CLINIC | Age: 32
End: 2022-08-25

## 2022-08-25 VITALS
RESPIRATION RATE: 18 BRPM | SYSTOLIC BLOOD PRESSURE: 132 MMHG | TEMPERATURE: 98.3 F | HEIGHT: 65 IN | WEIGHT: 154.6 LBS | HEART RATE: 98 BPM | DIASTOLIC BLOOD PRESSURE: 80 MMHG | BODY MASS INDEX: 25.76 KG/M2 | OXYGEN SATURATION: 99 %

## 2022-08-25 DIAGNOSIS — F41.9 ANXIETY: ICD-10-CM

## 2022-08-25 DIAGNOSIS — F33.41 RECURRENT MAJOR DEPRESSIVE DISORDER, IN PARTIAL REMISSION (HCC): Primary | ICD-10-CM

## 2022-08-25 DIAGNOSIS — I10 ESSENTIAL HYPERTENSION: ICD-10-CM

## 2022-08-25 PROCEDURE — 99214 OFFICE O/P EST MOD 30 MIN: CPT | Performed by: NURSE PRACTITIONER

## 2022-08-25 RX ORDER — CLONAZEPAM 0.5 MG/1
0.5 TABLET ORAL 2 TIMES DAILY
Qty: 30 TABLET | Refills: 0 | Status: SHIPPED | OUTPATIENT
Start: 2022-08-25

## 2022-08-25 RX ORDER — CITALOPRAM 20 MG/1
20 TABLET ORAL DAILY
Qty: 90 TABLET | Refills: 0 | Status: SHIPPED | OUTPATIENT
Start: 2022-08-25 | End: 2022-10-18

## 2022-08-25 NOTE — LETTER
August 25, 2022     Patient: Matt García  YOB: 1990  Date of Visit: 8/25/2022      To Whom it May Concern:    Yuliana Contreras is under my professional care  Vinay Gonzalez was seen in my office on 8/25/2022  Vinay Gonzalez may return to work on 8/29/22  If you have any questions or concerns, please don't hesitate to call           Sincerely,          SWETHA Quinonez        CC: No Recipients

## 2022-08-25 NOTE — ASSESSMENT & PLAN NOTE
emotional support alli, discussed walk in [address] card given  To start positive affirmation, to start using weighted blanket at hs

## 2022-08-25 NOTE — PROGRESS NOTES
OFFICE VISIT  Jose Kim 33 y o  female MRN: [id number]          Assessment / Plan:  Problem List Items Addressed This Visit        Cardiovascular and Mediastinum    Essential hypertension     continue hctz, blood pressure within acceptable range            Other    Anxiety     emotional support tiger, discussed walk in [address] card given  To start positive affirmation, to start using weighted blanket at hs  Relevant Medications    clonazePAM (KlonoPIN) 0 5 mg tablet    Recurrent major depressive disorder, in partial remission ([facility] [address] ) - Primary     We reviewed the risks and benefits of anti-depressant medication  We discussed that it can take several weeks for these medications to start working  Common side effects reviewed  The patient will follow-up in 1 month to recheck their symptoms  Relevant Medications    citalopram (CeleXA) 20 mg tablet    clonazePAM (KlonoPIN) 0 5 mg tablet            Reason For Visit / Chief Complaint  Chief Complaint   Patient presents with    Medication Problem     Body does not feel  right on the medication     Anxiety    Depression        HPI:  Jose Kim is a 33 y o  female who presents today for anxiety  She reports being on edge, having a hard time focusing on work  She reports feeling down  Having no energy, not getting out of bed  She reports having intrusive thoughts of Monday  She reports tuesday having a good day, and wednesay feeling down  She has stopped propanolol  She had increase anxiety when taking the medication           Historical Information   Past Medical History:   Diagnosis Date    High cholesterol     Kidney stones     LGSIL on Pap smear of cervix 2013     Past Surgical History:   Procedure Laterality Date    LITHOTRIPSY  2011    MOUTH SURGERY       Social History   Social History     Substance and Sexual Activity   Alcohol Use Yes    Alcohol/week: 2 0 standard drinks    Types: 2 Cans of beer per week    Comment: socially     Social History     Substance and Sexual Activity   Drug Use Not Currently    Types: Marijuana     Social History     Tobacco Use   Smoking Status Never Smoker   Smokeless Tobacco Never Used     History reviewed  No pertinent family history  Meds/Allergies   Allergies   Allergen Reactions    Morphine Rash       Meds:    Current Outpatient Medications:     Ascorbic Acid (VITAMIN C PO), Take 1 tablet by mouth daily, Disp: , Rfl:     Cholecalciferol (VITAMIN D3) 2000 units CHEW, Chew 2 tablets daily, Disp: , Rfl:     citalopram (CeleXA) 20 mg tablet, Take 1 tablet (20 mg total) by mouth daily, Disp: 90 tablet, Rfl: 0    clonazePAM (KlonoPIN) 0 5 mg tablet, Take 1 tablet (0 5 mg total) by mouth 2 (two) times a day, Disp: 30 tablet, Rfl: 0    fluticasone (FLONASE) 50 mcg/act nasal spray, 2 sprays into each nostril daily, Disp: , Rfl:     hydrochlorothiazide (HYDRODIURIL) 12 5 mg tablet, Take 1 tablet (12 5 mg total) by mouth daily, Disp: 30 tablet, Rfl: 0    meclizine (ANTIVERT) 12 5 MG tablet, Take 1 tablet (12 5 mg total) by mouth every 12 (twelve) hours as needed for dizziness, Disp: 30 tablet, Rfl: 0    Multiple Vitamins-Minerals (MULTIVITAMIN GUMMIES WOMENS) CHEW, Chew 2 tablets daily, Disp: , Rfl:     Norethin-Eth Estradiol-Fe 0 8-25 MG-MCG CHEW, Chew 1 tablet daily, Disp: 30 tablet, Rfl: 1    vitamin B-12 (VITAMIN B-12) 1,000 mcg tablet, Take 1,000 mcg by mouth daily, Disp: , Rfl:     vitamin B-12 (VITAMIN B-12) 1,000 mcg tablet, , Disp: , Rfl:     Norethin-Eth Estradiol-Fe 0 8-25 MG-MCG CHEW, CHEW ONE TABLET DAILY (Patient not taking: Reported on 8/25/2022), Disp: 84 tablet, Rfl: 0    Norethin-Eth Estradiol-Fe 0 8-25 MG-MCG CHEW, Chew 1 tablet daily, Disp: 90 tablet, Rfl: 3      REVIEW OF SYSTEMS  Review of Systems   Constitutional: Negative for activity change, chills, fatigue and fever     HENT: Negative for congestion, ear discharge, ear pain, sinus pressure, sinus pain, sore throat, tinnitus and trouble swallowing  Eyes: Negative for photophobia, pain, discharge, itching and visual disturbance  Respiratory: Negative for cough, chest tightness, shortness of breath and wheezing  Cardiovascular: Negative for chest pain and leg swelling  Gastrointestinal: Negative for abdominal distention, abdominal pain, constipation, diarrhea, nausea and vomiting  Endocrine: Negative for polydipsia, polyphagia and polyuria  Genitourinary: Negative for dysuria and frequency  Musculoskeletal: Negative for arthralgias, myalgias, neck pain and neck stiffness  Skin: Negative for color change  Neurological: Negative for dizziness, syncope, weakness, numbness and headaches  Hematological: Does not bruise/bleed easily  Psychiatric/Behavioral: Positive for sleep disturbance  Negative for behavioral problems, confusion, self-injury and suicidal ideas  The patient is nervous/anxious  Current Vitals:   Blood Pressure: 132/80 (08/25/22 1442)  Pulse: 98 (08/25/22 1442)  Temperature: 98 3 °F (36 8 °C) (08/25/22 1442)  Respirations: 18 (08/25/22 1442)  Height: 5' 5" (165 1 cm) (08/25/22 1442)  Weight - Scale: 70 1 kg (154 lb 9 6 oz) (08/25/22 1442)  SpO2: 99 % (08/25/22 1442)  [unfilled]    PHYSICAL EXAMS:  Physical Exam  Vitals and nursing note reviewed  Constitutional:       General: She is not in acute distress  Appearance: Normal appearance  She is well-developed  HENT:      Head: Normocephalic and atraumatic  Eyes:      Conjunctiva/sclera: Conjunctivae normal    Cardiovascular:      Rate and Rhythm: Normal rate and regular rhythm  Pulses: Normal pulses  Heart sounds: Normal heart sounds  No murmur heard  Pulmonary:      Effort: Pulmonary effort is normal  No respiratory distress  Breath sounds: Normal breath sounds  Abdominal:      Palpations: Abdomen is soft  Tenderness: There is no abdominal tenderness     Musculoskeletal:      Cervical back: Neck supple  Skin:     General: Skin is warm and dry  Neurological:      General: No focal deficit present  Mental Status: She is alert and oriented to person, place, and time  Psychiatric:         Mood and Affect: Mood normal          Behavior: Behavior normal              Lab, imaging and other studies: I have personally reviewed pertinent reports  You Hillman

## 2022-09-01 DIAGNOSIS — R42 VERTIGO: ICD-10-CM

## 2022-09-01 RX ORDER — MECLIZINE HCL 12.5 MG/1
TABLET ORAL
Qty: 30 TABLET | Refills: 0 | Status: SHIPPED | OUTPATIENT
Start: 2022-09-01

## 2022-09-17 DIAGNOSIS — I10 ESSENTIAL HYPERTENSION: ICD-10-CM

## 2022-09-18 RX ORDER — HYDROCHLOROTHIAZIDE 12.5 MG/1
TABLET ORAL
Qty: 30 TABLET | Refills: 0 | Status: SHIPPED | OUTPATIENT
Start: 2022-09-18 | End: 2022-10-17

## 2022-09-29 ENCOUNTER — TELEMEDICINE (OUTPATIENT)
Dept: FAMILY MEDICINE CLINIC | Facility: CLINIC | Age: 32
End: 2022-09-29

## 2022-09-29 VITALS — DIASTOLIC BLOOD PRESSURE: 88 MMHG | SYSTOLIC BLOOD PRESSURE: 134 MMHG

## 2022-09-29 DIAGNOSIS — F33.41 RECURRENT MAJOR DEPRESSIVE DISORDER, IN PARTIAL REMISSION (HCC): ICD-10-CM

## 2022-09-29 DIAGNOSIS — F41.9 ANXIETY: ICD-10-CM

## 2022-09-29 DIAGNOSIS — I10 ESSENTIAL HYPERTENSION: Primary | ICD-10-CM

## 2022-09-29 PROCEDURE — 99214 OFFICE O/P EST MOD 30 MIN: CPT | Performed by: NURSE PRACTITIONER

## 2022-09-29 NOTE — ASSESSMENT & PLAN NOTE
Reports less anxious, has made changes including [employer], meditation, and job change  She has use clonazepam as needed  Infrequently

## 2022-09-29 NOTE — ASSESSMENT & PLAN NOTE
Continue taking hydrochlorothiazide, spot check blood pressures  Has taken her blood pressure twice yesterday and today normal readings    No further changes at this time

## 2022-09-29 NOTE — ASSESSMENT & PLAN NOTE
Currently on Celexa 20 mg  Overall symptoms are stable at this time  Is making positive changes in her life, appetite and sleep are good

## 2022-09-29 NOTE — PROGRESS NOTES
Virtual Regular Visit    Verification of patient location:    Patient is located in the following state in which I hold an active license [place]      Assessment/Plan:    Problem List Items Addressed This Visit        Cardiovascular and Mediastinum    Essential hypertension - Primary     Continue taking hydrochlorothiazide, spot check blood pressures  Has taken her blood pressure twice yesterday and today normal readings  No further changes at this time            Other    Anxiety     Reports less anxious, has made changes including [employer], meditation, and job change  She has use clonazepam as needed  Infrequently  Recurrent major depressive disorder, in partial remission (HCC)     Currently on Celexa 20 mg  Overall symptoms are stable at this time  Is making positive changes in her life, appetite and sleep are good  Reason for visit is   Chief Complaint   Patient presents with    Follow-up     medication    Virtual Regular Visit        Encounter provider SWETHA Zapata    Provider located at [facility] [address] [place], [place] [address] [place] [address]  [phone]      Recent Visits  No visits were found meeting these conditions  Showing recent visits within past 7 days and meeting all other requirements  Today's Visits  Date Type Provider Dept   09/29/22 Telemedicine SWETHA [facility] [address] Ev[facility] Salazar today's visits and meeting all other requirements  Future Appointments  No visits were found meeting these conditions  Showing future appointments within next 150 days and meeting all other requirements       The patient was identified by name and date of birth  Kayleen Sousa was informed that this is a telemedicine visit and that the visit is being conducted through [facility] and patient was informed this is a secure, HIPAA-complaint platform  She agrees to proceed     My office door was closed   No one else was in the room   She acknowledged consent and understanding of privacy and security of the video platform  The patient has agreed to participate and understands they can discontinue the visit at any time  Patient is aware this is a billable service  Lesli Montgomery is a 33 y o  female who presents today for follow up  She reports doing much better, has been recently been accepted at new job  She has not needed her anxiety medication, she has used half tab/ she is meditation , affirmations  She is now involved in [employer] study and attending [employer]  She has reduced her hours  Sleep in good         HPI     Past Medical History:   Diagnosis Date    High cholesterol     Kidney stones     LGSIL on Pap smear of cervix 2013       Past Surgical History:   Procedure Laterality Date    LITHOTRIPSY  2011    MOUTH SURGERY         Current Outpatient Medications   Medication Sig Dispense Refill    Ascorbic Acid (VITAMIN C PO) Take 1 tablet by mouth daily      Cholecalciferol (VITAMIN D3) 2000 units CHEW Chew 2 tablets daily      citalopram (CeleXA) 20 mg tablet Take 1 tablet (20 mg total) by mouth daily 90 tablet 0    clonazePAM (KlonoPIN) 0 5 mg tablet Take 1 tablet (0 5 mg total) by mouth 2 (two) times a day 30 tablet 0    fluticasone (FLONASE) 50 mcg/act nasal spray 2 sprays into each nostril daily      hydrochlorothiazide (HYDRODIURIL) 12 5 mg tablet TAKE ONE TABLET BY MOUTH ONE TIME DAILY 30 tablet 0    meclizine (ANTIVERT) 12 5 MG tablet TAKE ONE TABLET BY MOUTH EVERY 12 HOURS AS NEEDED FOR DIZZINESS AND PALPITATIONS 30 tablet 0    Multiple Vitamins-Minerals (MULTIVITAMIN GUMMIES WOMENS) CHEW Chew 2 tablets daily      Norethin-Eth Estradiol-Fe 0 8-25 MG-MCG CHEW Chew 1 tablet daily 30 tablet 1    Norethin-Eth Estradiol-Fe 0 8-25 MG-MCG CHEW CHEW ONE TABLET DAILY (Patient not taking: Reported on 8/25/2022) 84 tablet 0    Norethin-Eth Estradiol-Fe 0 8-25 MG-MCG CHEW Chew 1 tablet daily 90 tablet 3    vitamin B-12 (VITAMIN B-12) 1,000 mcg tablet Take 1,000 mcg by mouth daily      vitamin B-12 (VITAMIN B-12) 1,000 mcg tablet        No current facility-administered medications for this visit  Allergies   Allergen Reactions    Morphine Rash       Review of Systems   Constitutional: Negative for activity change, chills, fatigue and fever  HENT: Negative for congestion, ear discharge, ear pain, sinus pressure, sinus pain, sore throat, tinnitus and trouble swallowing  Eyes: Negative for photophobia, pain, discharge, itching and visual disturbance  Respiratory: Negative for cough, chest tightness, shortness of breath and wheezing  Cardiovascular: Negative for chest pain and leg swelling  Gastrointestinal: Negative for abdominal distention, abdominal pain, constipation, diarrhea, nausea and vomiting  Endocrine: Negative for polydipsia, polyphagia and polyuria  Genitourinary: Negative for dysuria and frequency  Musculoskeletal: Negative for arthralgias, myalgias, neck pain and neck stiffness  Skin: Negative for color change  Neurological: Negative for dizziness, syncope, weakness, numbness and headaches  Hematological: Does not bruise/bleed easily  Psychiatric/Behavioral: Negative for behavioral problems, confusion, self-injury, sleep disturbance and suicidal ideas  The patient is nervous/anxious  Video Exam    Vitals:    09/29/22 0729   BP: 134/88       Physical Exam  Constitutional:       Appearance: Normal appearance  HENT:      Head: Normocephalic and atraumatic  Neurological:      General: No focal deficit present  Mental Status: She is alert     Psychiatric:         Mood and Affect: Mood normal          Behavior: Behavior normal           I spent 15 minutes with patient today in which greater than 50% of the time was spent in counseling/coordination of care regarding treatment plan

## 2022-10-17 ENCOUNTER — APPOINTMENT (OUTPATIENT)
Dept: LAB | Facility: MEDICAL CENTER | Age: 32
End: 2022-10-17

## 2022-10-17 DIAGNOSIS — I10 ESSENTIAL HYPERTENSION: ICD-10-CM

## 2022-10-17 RX ORDER — HYDROCHLOROTHIAZIDE 12.5 MG/1
TABLET ORAL
Qty: 30 TABLET | Refills: 0 | Status: SHIPPED | OUTPATIENT
Start: 2022-10-17

## 2022-10-18 DIAGNOSIS — F33.41 RECURRENT MAJOR DEPRESSIVE DISORDER, IN PARTIAL REMISSION (HCC): Primary | ICD-10-CM

## 2022-10-18 RX ORDER — CITALOPRAM 40 MG/1
40 TABLET ORAL DAILY
Qty: 90 TABLET | Refills: 0 | Status: SHIPPED | OUTPATIENT
Start: 2022-10-18

## 2022-11-18 DIAGNOSIS — I10 ESSENTIAL HYPERTENSION: ICD-10-CM

## 2022-11-18 RX ORDER — HYDROCHLOROTHIAZIDE 12.5 MG/1
TABLET ORAL
Qty: 30 TABLET | Refills: 0 | Status: SHIPPED | OUTPATIENT
Start: 2022-11-18

## 2022-12-16 ENCOUNTER — AMB VIDEO VISIT (OUTPATIENT)
Dept: OTHER | Facility: HOSPITAL | Age: 32
End: 2022-12-16

## 2022-12-16 DIAGNOSIS — J32.9 VIRAL SINUSITIS: Primary | ICD-10-CM

## 2022-12-16 DIAGNOSIS — B97.89 VIRAL SINUSITIS: Primary | ICD-10-CM

## 2022-12-16 RX ORDER — FLUTICASONE PROPIONATE 50 MCG
1 SPRAY, SUSPENSION (ML) NASAL DAILY
Qty: 9.9 ML | Refills: 0 | Status: SHIPPED | OUTPATIENT
Start: 2022-12-16

## 2022-12-16 NOTE — PATIENT INSTRUCTIONS
Sinusitis   WHAT YOU NEED TO KNOW:   Sinusitis is inflammation or infection of your sinuses  Sinusitis is most often caused by a virus  Acute sinusitis may last up to 12 weeks  Chronic sinusitis lasts longer than 12 weeks  Recurrent sinusitis means you have 4 or more infections in 1 year  DISCHARGE INSTRUCTIONS:   Return to the emergency department if:   You have trouble breathing or wheezing that is getting worse  You have a stiff neck, a fever, or a bad headache  You cannot open your eye  Your eyeball bulges out or you cannot move your eye  You are more sleepy than normal, or you notice changes in your ability to think, move, or talk  You have swelling of your forehead or scalp  Call your doctor if:   You have vision changes, such as double vision  Your eye and eyelid are red, swollen, and painful  Your symptoms do not improve or go away after 10 days  You have nausea and are vomiting  Your nose is bleeding  You have questions or concerns about your condition or care  Medicines: Your symptoms may go away on their own  Your healthcare provider may recommend watchful waiting for up to 10 days before starting antibiotics  You may need any of the following:  Acetaminophen  decreases pain and fever  It is available without a doctor's order  Ask how much to take and how often to take it  Follow directions  Read the labels of all other medicines you are using to see if they also contain acetaminophen, or ask your doctor or pharmacist  Acetaminophen can cause liver damage if not taken correctly  Do not use more than 4 grams (4,000 milligrams) total of acetaminophen in one day  NSAIDs , such as ibuprofen, help decrease swelling, pain, and fever  This medicine is available with or without a doctor's order  NSAIDs can cause stomach bleeding or kidney problems in certain people   If you take blood thinner medicine, always ask your healthcare provider if NSAIDs are safe for you  Always read the medicine label and follow directions  Nasal steroid sprays  may help decrease inflammation in your nose and sinuses  Decongestants  help reduce swelling and drain mucus in the nose and sinuses  They may help you breathe easier  Antihistamines  help dry mucus in the nose and relieve sneezing  Antibiotics  help treat or prevent a bacterial infection  Take your medicine as directed  Contact your healthcare provider if you think your medicine is not helping or if you have side effects  Tell him or her if you are allergic to any medicine  Keep a list of the medicines, vitamins, and herbs you take  Include the amounts, and when and why you take them  Bring the list or the pill bottles to follow-up visits  Carry your medicine list with you in case of an emergency  Self-care:   Rinse your sinuses as directed  Use a sinus rinse device to rinse your nasal passages with a saline (salt water) solution or distilled water  Do not use tap water  This will help thin the mucus in your nose and rinse away pollen and dirt  It will also help reduce swelling so you can breathe normally  Use a humidifier  to increase air moisture in your home  This may make it easier for you to breathe and help decrease your cough  Sleep with your head elevated  Place an extra pillow under your head before you go to sleep to help your sinuses drain  Drink liquids as directed  Ask your healthcare provider how much liquid to drink each day and which liquids are best for you  Liquids will thin the mucus in your nose and help it drain  Avoid drinks that contain alcohol or caffeine  Do not smoke, and avoid secondhand smoke  Nicotine and other chemicals in cigarettes and cigars can make your symptoms worse  Ask your healthcare provider for information if you currently smoke and need help to quit  E-cigarettes or smokeless tobacco still contain nicotine   Talk to your healthcare provider before you use these products  Prevent the spread of germs:   Wash your hands often with soap and water  Wash your hands after you use the bathroom, change a child's diaper, or sneeze  Wash your hands before you prepare or eat food  Stay away from people who are sick  Some germs spread easily and quickly through contact  Follow up with your doctor as directed: You may be referred to an ear, nose, and throat specialist  Write down your questions so you remember to ask them during your visits  © Copyright [employer] 2022 Information is for End User's use only and may not be sold, redistributed or otherwise used for commercial purposes  All illustrations and images included in CareNotes® are the copyrighted property of A D A M , Inc  or [facility] Armida Jung   The above information is an  only  It is not intended as medical advice for individual conditions or treatments  Talk to your doctor, nurse or pharmacist before following any medical regimen to see if it is safe and effective for you

## 2022-12-16 NOTE — PROGRESS NOTES
Video Visit - Zayra Huston 33 y o  female MRN: [id number]    REQUIRED DOCUMENTATION:         1  This service was provided via [employer]  2  Provider located at [address] [place] [id number] [phone]  3  [facility] provider: Ave Colin PA-C   4  Identify all parties in room with patient during [facility] visit:  No one else  5  After connecting through [employer], patient was identified by name and date of birth  Patient was then informed that this was a Telemedicine visit and that the exam was being conducted confidentially over secure lines  My office door was closed  No one else was in the room  Patient acknowledged consent and understanding of privacy and security of the Telemedicine visit  I informed the patient that I have reviewed their record in Epic and presented the opportunity for them to ask any questions regarding the visit today  The patient agreed to participate  [facility]  [phone]    Patient states Tuesday she started with fatigue, congestion  Tuesday she said its seems like a bus hit her  She is having sinus congestion and pressure, PND, coughing up mucus  She denies any fevers, nausea, vomiting, SOB, CP  She tried mucinex without relief  She tried Claritin D  She did not do a covid test  She does often have seasonal allergies and PND  She is feeling slightly better today  Physical Exam  Constitutional:       General: She is not in acute distress  Appearance: Normal appearance  She is not ill-appearing, toxic-appearing or diaphoretic  HENT:      Nose: Congestion present  Comments: No TTP over sinuses  Pulmonary:      Effort: Pulmonary effort is normal  No respiratory distress  Comments: Talking in complete sentences, no audible wheezing  Lymphadenopathy:      Cervical: No cervical adenopathy  Skin:     General: Skin is dry  Neurological:      General: No focal deficit present        Mental Status: She is alert and oriented to person, place, and time  Psychiatric:         Mood and Affect: Mood normal          Behavior: Behavior normal          Diagnoses and all orders for this visit:    Viral sinusitis  -     fluticasone (FLONASE) 50 mcg/act nasal spray; 1 spray into each nostril daily    likely still viral   Continue OTC meds  If by day 7-10 without improvement my be more bacterial requiring antibiotics  Patient Instructions     Sinusitis   WHAT YOU NEED TO KNOW:   Sinusitis is inflammation or infection of your sinuses  Sinusitis is most often caused by a virus  Acute sinusitis may last up to 12 weeks  Chronic sinusitis lasts longer than 12 weeks  Recurrent sinusitis means you have 4 or more infections in 1 year  DISCHARGE INSTRUCTIONS:   Return to the emergency department if:   · You have trouble breathing or wheezing that is getting worse  · You have a stiff neck, a fever, or a bad headache  · You cannot open your eye  · Your eyeball bulges out or you cannot move your eye  · You are more sleepy than normal, or you notice changes in your ability to think, move, or talk  · You have swelling of your forehead or scalp  Call your doctor if:   · You have vision changes, such as double vision  · Your eye and eyelid are red, swollen, and painful  · Your symptoms do not improve or go away after 10 days  · You have nausea and are vomiting  · Your nose is bleeding  · You have questions or concerns about your condition or care  Medicines: Your symptoms may go away on their own  Your healthcare provider may recommend watchful waiting for up to 10 days before starting antibiotics  You may need any of the following:  · Acetaminophen  decreases pain and fever  It is available without a doctor's order  Ask how much to take and how often to take it  Follow directions   Read the labels of all other medicines you are using to see if they also contain acetaminophen, or ask your doctor or pharmacist  Acetaminophen can cause liver damage if not taken correctly  Do not use more than 4 grams (4,000 milligrams) total of acetaminophen in one day  · NSAIDs , such as ibuprofen, help decrease swelling, pain, and fever  This medicine is available with or without a doctor's order  NSAIDs can cause stomach bleeding or kidney problems in certain people  If you take blood thinner medicine, always ask your healthcare provider if NSAIDs are safe for you  Always read the medicine label and follow directions  · Nasal steroid sprays  may help decrease inflammation in your nose and sinuses  · Decongestants  help reduce swelling and drain mucus in the nose and sinuses  They may help you breathe easier  · Antihistamines  help dry mucus in the nose and relieve sneezing  · Antibiotics  help treat or prevent a bacterial infection  · Take your medicine as directed  Contact your healthcare provider if you think your medicine is not helping or if you have side effects  Tell him or her if you are allergic to any medicine  Keep a list of the medicines, vitamins, and herbs you take  Include the amounts, and when and why you take them  Bring the list or the pill bottles to follow-up visits  Carry your medicine list with you in case of an emergency  Self-care:   · Rinse your sinuses as directed  Use a sinus rinse device to rinse your nasal passages with a saline (salt water) solution or distilled water  Do not use tap water  This will help thin the mucus in your nose and rinse away pollen and dirt  It will also help reduce swelling so you can breathe normally  · Use a humidifier  to increase air moisture in your home  This may make it easier for you to breathe and help decrease your cough  · Sleep with your head elevated  Place an extra pillow under your head before you go to sleep to help your sinuses drain  · Drink liquids as directed    Ask your healthcare provider how much liquid to drink each day and which liquids are best for you  Liquids will thin the mucus in your nose and help it drain  Avoid drinks that contain alcohol or caffeine  · Do not smoke, and avoid secondhand smoke  Nicotine and other chemicals in cigarettes and cigars can make your symptoms worse  Ask your healthcare provider for information if you currently smoke and need help to quit  E-cigarettes or smokeless tobacco still contain nicotine  Talk to your healthcare provider before you use these products  Prevent the spread of germs:   · Wash your hands often with soap and water  Wash your hands after you use the bathroom, change a child's diaper, or sneeze  Wash your hands before you prepare or eat food  · Stay away from people who are sick  Some germs spread easily and quickly through contact  Follow up with your doctor as directed: You may be referred to an ear, nose, and throat specialist  Write down your questions so you remember to ask them during your visits  © [employer] 2022 Information is for End User's use only and may not be sold, redistributed or otherwise used for commercial purposes  All illustrations and images included in CareNotes® are the copyrighted property of [employer]  or [facility] Armida Jung   The above information is an  only  It is not intended as medical advice for individual conditions or treatments  Talk to your doctor, nurse or pharmacist before following any medical regimen to see if it is safe and effective for you

## 2022-12-16 NOTE — CARE ANYWHERE EVISITS
Visit Summary for Sheridan Astudillo - Gender: Female - Date of Birth: [id number]  Date: [id number] - Duration: 5 minutes  Patient: Sheridan Astudillo  Provider: Taiwo Matthew PA-C    Patient Contact Information  Address  [address] [place] [id number]  [phone]    Visit Topics  Headache [Added By: Self - 2022-12-16]  Earache [Added By: Self - 2022-12-16]  Cold [Added By: Self - 2022-12-16]    Triage Questions   What is your current physical address in the event of a medical emergency? Answer []  Are you allergic to any medications? Answer []  Are you now or could you be pregnant? Answer []  Do you have any immune system compromise or chronic lung   disease? Answer []  Do you have any vulnerable family members in the home (infant, pregnant, cancer, elderly)? Answer []     Conversation Transcripts  [0A][0A] [Notification] You are connected with Taiwo Matthew PA-C, Urgent Care Specialist [0A][Notification] Radha Solitario is located in [place]  [0A][Notification] Radha Solitario has shared health history  Adal Norris  [0A]    Diagnosis  Chronic sinusitis, unspecified    Procedures  Value: [id number] Code: CPT-4 UNLISTED E&M SERVICE    Medications Prescribed    No prescriptions ordered    Electronically signed by: Fouzia Kennedy(NPI [id number])

## 2022-12-22 DIAGNOSIS — I10 ESSENTIAL HYPERTENSION: ICD-10-CM

## 2022-12-22 RX ORDER — HYDROCHLOROTHIAZIDE 12.5 MG/1
TABLET ORAL
Qty: 30 TABLET | Refills: 0 | Status: SHIPPED | OUTPATIENT
Start: 2022-12-22

## 2023-01-23 DIAGNOSIS — I10 ESSENTIAL HYPERTENSION: ICD-10-CM

## 2023-01-23 RX ORDER — HYDROCHLOROTHIAZIDE 12.5 MG/1
TABLET ORAL
Qty: 30 TABLET | Refills: 0 | Status: SHIPPED | OUTPATIENT
Start: 2023-01-23

## 2023-02-22 ENCOUNTER — OFFICE VISIT (OUTPATIENT)
Dept: FAMILY MEDICINE CLINIC | Facility: CLINIC | Age: 33
End: 2023-02-22

## 2023-02-22 VITALS
WEIGHT: 164 LBS | DIASTOLIC BLOOD PRESSURE: 78 MMHG | BODY MASS INDEX: 27.32 KG/M2 | RESPIRATION RATE: 18 BRPM | OXYGEN SATURATION: 98 % | TEMPERATURE: 97.8 F | HEIGHT: 65 IN | HEART RATE: 92 BPM | SYSTOLIC BLOOD PRESSURE: 114 MMHG

## 2023-02-22 DIAGNOSIS — J06.9 ACUTE URI: ICD-10-CM

## 2023-02-22 DIAGNOSIS — Z11.59 NEED FOR HEPATITIS C SCREENING TEST: ICD-10-CM

## 2023-02-22 DIAGNOSIS — E55.9 VITAMIN D DEFICIENCY: ICD-10-CM

## 2023-02-22 DIAGNOSIS — F32.A ANXIETY AND DEPRESSION: ICD-10-CM

## 2023-02-22 DIAGNOSIS — F41.9 ANXIETY AND DEPRESSION: ICD-10-CM

## 2023-02-22 DIAGNOSIS — Z00.00 ANNUAL PHYSICAL EXAM: ICD-10-CM

## 2023-02-22 DIAGNOSIS — I10 ESSENTIAL HYPERTENSION: Primary | ICD-10-CM

## 2023-02-22 DIAGNOSIS — E78.5 DYSLIPIDEMIA: ICD-10-CM

## 2023-02-22 PROBLEM — F43.23 ADJUSTMENT DISORDER WITH MIXED ANXIETY AND DEPRESSED MOOD: Status: RESOLVED | Noted: 2018-08-31 | Resolved: 2023-02-22

## 2023-02-22 PROBLEM — F33.41 RECURRENT MAJOR DEPRESSIVE DISORDER, IN PARTIAL REMISSION (HCC): Status: RESOLVED | Noted: 2022-08-16 | Resolved: 2023-02-22

## 2023-02-22 PROBLEM — R42 VERTIGO: Status: RESOLVED | Noted: 2022-08-19 | Resolved: 2023-02-22

## 2023-02-22 LAB
SARS-COV-2 AG UPPER RESP QL IA: NEGATIVE
VALID CONTROL: NORMAL

## 2023-02-22 NOTE — PROGRESS NOTES
Name: Alexander Medrano      : 1990      MRN: [id number]  Encounter Provider: Layla Sterling MD  Encounter Date: 2023   Encounter department: [address] [place] [address] MEDICINE    Assessment & Plan     1  Essential hypertension  Assessment & Plan:  Well controlled on current therapy continue with current medications and will reassess next visit        2  Vitamin D deficiency  Assessment & Plan: On otc supplement       3  Dyslipidemia  Assessment & Plan:   last yr will recheck      4  Need for hepatitis C screening test  -     Hepatitis C Antibody ([facility]); Future    5  Annual physical exam  -     CBC and differential; Future  -     Comprehensive metabolic panel; Future; Expected date: 2023  -     Lipid panel; Future; Expected date: 2023    6  Anxiety and depression  Assessment & Plan:  Ok on celexa      7  Acute URI  Assessment & Plan:  covid neg cont otc    Orders:  -     POCT Rapid Covid Ag           Subjective      HPI new prt to me here for initial visit /physical  Depression and anxiety ok on meds htn on HCTZ currently 1 week cold congestion  Pt on medcal marijuana for anxiety  Review of Systems   Constitutional: Negative for appetite change, chills, fatigue and fever  Respiratory: Negative for cough, chest tightness and shortness of breath  Cardiovascular: Negative for chest pain, palpitations and leg swelling  Gastrointestinal: Negative for abdominal pain, constipation, diarrhea, nausea and vomiting  Genitourinary: Negative for difficulty urinating and frequency  Musculoskeletal: Negative for arthralgias, back pain, gait problem and neck pain  Skin: Negative for rash  Neurological: Negative for dizziness, weakness, light-headedness, numbness and headaches  Hematological: Does not bruise/bleed easily  Psychiatric/Behavioral: Negative for dysphoric mood and sleep disturbance  The patient is not nervous/anxious          Current Outpatient Medications on File Prior to Visit   Medication Sig   • Ascorbic Acid (VITAMIN C PO) Take 1 tablet by mouth daily   • citalopram (CeleXA) 40 mg tablet Take 1 tablet (40 mg total) by mouth daily   • Fish Oil-Cholecalciferol (FISH OIL + D3 PO) Take by mouth   • fluticasone (FLONASE) 50 mcg/act nasal spray 1 spray into each nostril daily   • hydrochlorothiazide (HYDRODIURIL) 12 5 mg tablet TAKE ONE TABLET BY MOUTH ONE TIME DAILY   • Norethin-Eth Estradiol-Fe 0 8-25 MG-MCG CHEW Chew 1 tablet daily   • Norethin-Eth Estradiol-Fe 0 8-25 MG-MCG CHEW Chew 1 tablet daily   • [DISCONTINUED] Cholecalciferol (VITAMIN D3) 2000 units CHEW Chew 2 tablets daily (Patient not taking: Reported on 2/22/2023)   • [DISCONTINUED] clonazePAM (KlonoPIN) 0 5 mg tablet Take 1 tablet (0 5 mg total) by mouth 2 (two) times a day (Patient not taking: Reported on 2/22/2023)   • [DISCONTINUED] fluticasone (FLONASE) 50 mcg/act nasal spray 2 sprays into each nostril daily (Patient not taking: Reported on 2/22/2023)   • [DISCONTINUED] meclizine (ANTIVERT) 12 5 MG tablet TAKE ONE TABLET BY MOUTH EVERY 12 HOURS AS NEEDED FOR DIZZINESS AND PALPITATIONS   • [DISCONTINUED] Multiple Vitamins-Minerals (MULTIVITAMIN GUMMIES WOMENS) CHEW Chew 2 tablets daily (Patient not taking: Reported on 2/22/2023)   • [DISCONTINUED] Norethin-Eth Estradiol-Fe 0 8-25 MG-MCG CHEW CHEW ONE TABLET DAILY (Patient not taking: Reported on 8/25/2022)   • [DISCONTINUED] vitamin B-12 (VITAMIN B-12) 1,000 mcg tablet Take 1,000 mcg by mouth daily (Patient not taking: Reported on 2/22/2023)   • [DISCONTINUED] vitamin B-12 (VITAMIN B-12) 1,000 mcg tablet  (Patient not taking: Reported on 2/22/2023)       Objective     /78 (BP Location: Left arm, Patient Position: Sitting, Cuff Size: Adult)   Pulse 92   Temp 97 8 °F (36 6 °C) (Tympanic)   Resp 18   Ht 5' 5" (1 651 m)   Wt 74 4 kg (164 lb)   SpO2 98%   BMI 27 29 kg/m²     Physical Exam  Vitals reviewed     Constitutional: General: She is not in acute distress  Appearance: Normal appearance  She is well-developed  She is not ill-appearing  HENT:      Head: Normocephalic  Right Ear: Tympanic membrane, ear canal and external ear normal       Left Ear: Tympanic membrane, ear canal and external ear normal       Nose: Nose normal       Mouth/Throat:      Mouth: Mucous membranes are moist       Pharynx: No oropharyngeal exudate  Eyes:      General: Lids are normal  No scleral icterus  Extraocular Movements: Extraocular movements intact  Conjunctiva/sclera: Conjunctivae normal       Pupils: Pupils are equal, round, and reactive to light  Neck:      Thyroid: No thyromegaly  Vascular: No carotid bruit  Cardiovascular:      Rate and Rhythm: Normal rate and regular rhythm  Pulses: Normal pulses  Heart sounds: Normal heart sounds  No murmur heard  No friction rub  Pulmonary:      Effort: Pulmonary effort is normal       Breath sounds: Normal breath sounds  No wheezing, rhonchi or rales  Abdominal:      General: Bowel sounds are normal  There is no distension  Palpations: Abdomen is soft  There is no mass  Tenderness: There is no abdominal tenderness  There is no guarding  Hernia: No hernia is present  Musculoskeletal:         General: Normal range of motion  Cervical back: Normal range of motion and neck supple  Lymphadenopathy:      Cervical: No cervical adenopathy  Skin:     General: Skin is warm and dry  Findings: No rash  Comments: No abnormal appearing moles   Neurological:      General: No focal deficit present  Mental Status: She is alert and oriented to person, place, and time  Mental status is at baseline  Cranial Nerves: No cranial nerve deficit  Sensory: No sensory deficit  Motor: No weakness, tremor or abnormal muscle tone        Coordination: Coordination normal       Gait: Gait normal       Deep Tendon Reflexes: Reflexes normal  Psychiatric:         Mood and Affect: Mood normal          Speech: Speech normal          Behavior: Behavior normal        iGlberto Jeff MD

## 2023-04-23 PROBLEM — J06.9 ACUTE URI: Status: RESOLVED | Noted: 2023-02-22 | Resolved: 2023-04-23

## 2023-05-13 DIAGNOSIS — Z30.41 SURVEILLANCE FOR BIRTH CONTROL, ORAL CONTRACEPTIVES: ICD-10-CM

## 2023-05-15 RX ORDER — NORETHINDRONE AND ETHINYL ESTRADIOL 0.8-25(24)
KIT ORAL
Qty: 84 TABLET | Refills: 0 | Status: SHIPPED | OUTPATIENT
Start: 2023-05-15

## 2023-06-05 DIAGNOSIS — F41.9 ANXIETY: Primary | ICD-10-CM

## 2023-06-05 RX ORDER — ALPRAZOLAM 0.25 MG/1
0.25 TABLET ORAL DAILY PRN
Qty: 6 TABLET | Refills: 0 | Status: SHIPPED | OUTPATIENT
Start: 2023-06-05 | End: 2023-06-12

## 2023-06-12 ENCOUNTER — OFFICE VISIT (OUTPATIENT)
Dept: FAMILY MEDICINE CLINIC | Facility: CLINIC | Age: 33
End: 2023-06-12
Payer: COMMERCIAL

## 2023-06-12 VITALS
HEART RATE: 95 BPM | HEIGHT: 65 IN | DIASTOLIC BLOOD PRESSURE: 82 MMHG | RESPIRATION RATE: 16 BRPM | OXYGEN SATURATION: 99 % | WEIGHT: 160 LBS | TEMPERATURE: 98 F | SYSTOLIC BLOOD PRESSURE: 130 MMHG | BODY MASS INDEX: 26.66 KG/M2

## 2023-06-12 DIAGNOSIS — F39 MOOD DISORDER (HCC): ICD-10-CM

## 2023-06-12 DIAGNOSIS — F32.A ANXIETY AND DEPRESSION: Primary | ICD-10-CM

## 2023-06-12 DIAGNOSIS — F41.9 ANXIETY AND DEPRESSION: Primary | ICD-10-CM

## 2023-06-12 PROBLEM — I10 ESSENTIAL HYPERTENSION: Status: RESOLVED | Noted: 2022-08-16 | Resolved: 2023-06-12

## 2023-06-12 PROCEDURE — 99214 OFFICE O/P EST MOD 30 MIN: CPT | Performed by: FAMILY MEDICINE

## 2023-06-12 RX ORDER — ARIPIPRAZOLE 2 MG/1
2 TABLET ORAL DAILY
Qty: 30 TABLET | Refills: 3 | Status: SHIPPED | OUTPATIENT
Start: 2023-06-12

## 2023-06-12 NOTE — PROGRESS NOTES
"Name: Jaime Campbell      : 1990      MRN: [id number]  Encounter Provider: Juan Pablo Reyez MD  Encounter Date: 2023   Encounter department: [address] [place] [address] MEDICINE    Assessment & Plan     1  Anxiety and depression  -     ARIPiprazole (ABILIFY) 2 mg tablet; Take 1 tablet (2 mg total) by mouth daily    2  Mood disorder ([address] )  Assessment & Plan:  Not clearly bipolar per questionaire: has had   voices (psychotic element ) needs to see psychiatrist  Will add abilify 2mg po qd  For now  Pt not suicidal not homicidal  Any further or acceleration in voices must go to ER will keep out of work for 1 week (did not want any more time off)    Orders:  -     Ambulatory Referral to Psychiatry; Future           Subjective      HPI pt has been on celexa for 5 yrs  Last yr had excess anxiety(  Mother has anxiety disorder on ativan) pt had episode last yr of hearing voices that say \"hurt ____\" \"killing mother and dog\" with no instructions or details  Now getting those voices again  Pt is afraid of these since she is not an angry violent person mother and pt state has never had signs of hurting  Anyone   3 weeks  Ago pt lost best friend who had Type 1 diabetes  Pt now with sleep disturbance isnt sleep and loss of appetite  pt has very stressful job at this time with difficult boss and  Lack of training       Review of Systems   Psychiatric/Behavioral: Positive for sleep disturbance  Negative for self-injury and suicidal ideas  The patient is nervous/anxious           Hears voices none now       Current Outpatient Medications on File Prior to Visit   Medication Sig   • Ascorbic Acid (VITAMIN C PO) Take 1 tablet by mouth daily   • citalopram (CeleXA) 40 mg tablet Take 1 tablet (40 mg total) by mouth daily   • Fish Oil-Cholecalciferol (FISH OIL + D3 PO) Take by mouth   • fluticasone (FLONASE) 50 mcg/act nasal spray 1 spray into each nostril daily   • hydrochlorothiazide (HYDRODIURIL) 12 5 mg tablet Take 1 " "tablet (12 5 mg total) by mouth daily   • Kaitlib Fe 0 8-25 MG-MCG CHEW CHEW AND SWALLOW ONE TABLET BY MOUTH ONE TIME DAILY   • Norethin-Eth Estradiol-Fe 0 8-25 MG-MCG CHEW Chew 1 tablet daily (Patient not taking: Reported on 6/12/2023)   • [DISCONTINUED] ALPRAZolam (XANAX) 0 25 mg tablet Take 1 tablet (0 25 mg total) by mouth daily as needed for anxiety (Patient not taking: Reported on 6/12/2023)       Objective     /82 (BP Location: Left arm, Patient Position: Sitting, Cuff Size: Standard)   Pulse 95   Temp 98 °F (36 7 °C) (Tympanic)   Resp 16   Ht 5' 5\" (1 651 m)   Wt 72 6 kg (160 lb)   SpO2 99%   BMI 26 63 kg/m²     Physical Exam  Constitutional:       General: She is not in acute distress  Appearance: Normal appearance  She is well-developed  She is not ill-appearing  Eyes:      Extraocular Movements: Extraocular movements intact  Neck:      Thyroid: No thyromegaly  Cardiovascular:      Rate and Rhythm: Normal rate  Pulmonary:      Effort: Pulmonary effort is normal  No respiratory distress  Breath sounds: Normal breath sounds  Musculoskeletal:      Cervical back: Normal range of motion  Neurological:      General: No focal deficit present  Mental Status: She is alert and oriented to person, place, and time  Mental status is at baseline     Psychiatric:         Mood and Affect: Mood normal          Behavior: Behavior normal        Radames Jenkins MD  "

## 2023-06-12 NOTE — ASSESSMENT & PLAN NOTE
Not clearly bipolar per questionaire: has had   voices (psychotic element ) needs to see psychiatrist  Will add abilify 2mg po qd  For now  Pt not suicidal not homicidal  Any further or acceleration in voices must go to ER will keep out of work for 1 week (did not want any more time off)

## 2023-06-16 ENCOUNTER — TELEPHONE (OUTPATIENT)
Dept: PSYCHIATRY | Facility: CLINIC | Age: 33
End: 2023-06-16

## 2023-06-16 NOTE — TELEPHONE ENCOUNTER
Reached out to patient in regards to routine referral in attempts to verify patient's needs of service  Spoke with patient, she has established [employer] outside of network  Writer informed pt to contact intake department if needed

## 2023-06-19 ENCOUNTER — OFFICE VISIT (OUTPATIENT)
Dept: FAMILY MEDICINE CLINIC | Facility: CLINIC | Age: 33
End: 2023-06-19
Payer: COMMERCIAL

## 2023-06-19 VITALS
DIASTOLIC BLOOD PRESSURE: 80 MMHG | SYSTOLIC BLOOD PRESSURE: 130 MMHG | HEIGHT: 65 IN | TEMPERATURE: 97.2 F | HEART RATE: 82 BPM | RESPIRATION RATE: 16 BRPM | OXYGEN SATURATION: 99 % | BODY MASS INDEX: 26.16 KG/M2 | WEIGHT: 157 LBS

## 2023-06-19 DIAGNOSIS — F39 MOOD DISORDER (HCC): Primary | ICD-10-CM

## 2023-06-19 PROCEDURE — 99213 OFFICE O/P EST LOW 20 MIN: CPT | Performed by: FAMILY MEDICINE

## 2023-06-19 NOTE — PROGRESS NOTES
"Name: Carroll Perez      : 1990      MRN: [id number]  Encounter Provider: Jaye Putnam MD  Encounter Date: 2023   Encounter department: [address] [place] [address] Dr MEDICINE    Assessment & Plan     1  Mood disorder [facility])  Assessment & Plan:  Pt feeling better on abilify will be seeing psychiatry              Subjective      HPI pt is feeling better on abilify eating better more energy voices have dissipated has appt with psychiatry      Review of Systems   Psychiatric/Behavioral: Negative for dysphoric mood, hallucinations, self-injury, sleep disturbance and suicidal ideas  The patient is nervous/anxious  Current Outpatient Medications on File Prior to Visit   Medication Sig   • ARIPiprazole (ABILIFY) 2 mg tablet Take 1 tablet (2 mg total) by mouth daily   • citalopram (CeleXA) 40 mg tablet Take 1 tablet (40 mg total) by mouth daily   • Fish Oil-Cholecalciferol (FISH OIL + D3 PO) Take by mouth   • fluticasone (FLONASE) 50 mcg/act nasal spray 1 spray into each nostril daily   • hydrochlorothiazide (HYDRODIURIL) 12 5 mg tablet Take 1 tablet (12 5 mg total) by mouth daily   • Kaitlib Fe 0 8-25 MG-MCG CHEW CHEW AND SWALLOW ONE TABLET BY MOUTH ONE TIME DAILY   • Ascorbic Acid (VITAMIN C PO) Take 1 tablet by mouth daily   • Norethin-Eth Estradiol-Fe 0 8-25 MG-MCG CHEW Chew 1 tablet daily (Patient not taking: Reported on 2023)       Objective     /80 (BP Location: Left arm, Patient Position: Sitting, Cuff Size: Standard)   Pulse 82   Temp (!) 97 2 °F (36 2 °C) (Tympanic)   Resp 16   Ht 5' 5\" (1 651 m)   Wt 71 2 kg (157 lb)   SpO2 99%   BMI 26 13 kg/m²     Physical Exam  Constitutional:       General: She is not in acute distress  Appearance: Normal appearance  She is well-developed  She is not ill-appearing  Eyes:      Extraocular Movements: Extraocular movements intact  Neck:      Thyroid: No thyromegaly  Cardiovascular:      Rate and Rhythm: Normal rate   " Pulmonary:      Effort: Pulmonary effort is normal  No respiratory distress  Breath sounds: Normal breath sounds  Musculoskeletal:      Cervical back: Normal range of motion  Neurological:      General: No focal deficit present  Mental Status: She is alert and oriented to person, place, and time  Mental status is at baseline     Psychiatric:         Mood and Affect: Mood normal          Behavior: Behavior normal        Thais Phipps MD

## 2023-07-07 ENCOUNTER — ANNUAL EXAM (OUTPATIENT)
Dept: GYNECOLOGY | Facility: CLINIC | Age: 33
End: 2023-07-07
Payer: COMMERCIAL

## 2023-07-07 VITALS
DIASTOLIC BLOOD PRESSURE: 70 MMHG | HEIGHT: 65 IN | SYSTOLIC BLOOD PRESSURE: 124 MMHG | WEIGHT: 163 LBS | BODY MASS INDEX: 27.16 KG/M2

## 2023-07-07 DIAGNOSIS — Z12.39 ENCOUNTER FOR SCREENING BREAST EXAMINATION: ICD-10-CM

## 2023-07-07 DIAGNOSIS — Z30.42 SURVEILLANCE OF CONTRACEPTIVE INJECTION: ICD-10-CM

## 2023-07-07 DIAGNOSIS — G43.109 MIGRAINE WITH AURA AND WITHOUT STATUS MIGRAINOSUS, NOT INTRACTABLE: ICD-10-CM

## 2023-07-07 DIAGNOSIS — Z01.419 ENCOUNTER FOR WELL WOMAN EXAM: Primary | ICD-10-CM

## 2023-07-07 PROCEDURE — S0612 ANNUAL GYNECOLOGICAL EXAMINA: HCPCS | Performed by: PHYSICIAN ASSISTANT

## 2023-07-07 RX ORDER — MEDROXYPROGESTERONE ACETATE 150 MG/ML
150 INJECTION, SUSPENSION INTRAMUSCULAR
Qty: 1 ML | Refills: 3 | Status: SHIPPED | OUTPATIENT
Start: 2023-07-07

## 2023-07-07 RX ORDER — NORETHINDRONE AND ETHINYL ESTRADIOL AND FERROUS FUMARATE 0.8-25(24)
1 KIT ORAL DAILY
Qty: 30 TABLET | Refills: 12 | Status: SHIPPED | OUTPATIENT
Start: 2023-07-07 | End: 2023-07-07 | Stop reason: ALTCHOICE

## 2023-07-13 ENCOUNTER — CLINICAL SUPPORT (OUTPATIENT)
Dept: GYNECOLOGY | Facility: CLINIC | Age: 33
End: 2023-07-13
Payer: COMMERCIAL

## 2023-07-13 VITALS
SYSTOLIC BLOOD PRESSURE: 120 MMHG | BODY MASS INDEX: 27.59 KG/M2 | DIASTOLIC BLOOD PRESSURE: 70 MMHG | HEIGHT: 65 IN | WEIGHT: 165.6 LBS

## 2023-07-13 DIAGNOSIS — Z30.42 ENCOUNTER FOR SURVEILLANCE OF INJECTABLE CONTRACEPTIVE: Primary | ICD-10-CM

## 2023-07-13 PROCEDURE — 96372 THER/PROPH/DIAG INJ SC/IM: CPT

## 2023-07-13 RX ORDER — MEDROXYPROGESTERONE ACETATE 150 MG/ML
150 INJECTION, SUSPENSION INTRAMUSCULAR ONCE
Status: COMPLETED | OUTPATIENT
Start: 2023-07-13 | End: 2023-07-13

## 2023-07-13 RX ADMIN — MEDROXYPROGESTERONE ACETATE 150 MG: 150 INJECTION, SUSPENSION INTRAMUSCULAR at 08:44

## 2023-07-13 NOTE — PROGRESS NOTES
1st Depo Injection in Right Delt    Lot # [id number]  [address]  - [id number]    Pt tolerated injection well

## 2023-09-28 ENCOUNTER — CLINICAL SUPPORT (OUTPATIENT)
Dept: GYNECOLOGY | Facility: CLINIC | Age: 33
End: 2023-09-28
Payer: COMMERCIAL

## 2023-09-28 VITALS — DIASTOLIC BLOOD PRESSURE: 80 MMHG | SYSTOLIC BLOOD PRESSURE: 122 MMHG

## 2023-09-28 DIAGNOSIS — Z30.42 ENCOUNTER FOR SURVEILLANCE OF INJECTABLE CONTRACEPTIVE: Primary | ICD-10-CM

## 2023-09-28 PROCEDURE — 96372 THER/PROPH/DIAG INJ SC/IM: CPT

## 2023-09-28 RX ORDER — MEDROXYPROGESTERONE ACETATE 150 MG/ML
150 INJECTION, SUSPENSION INTRAMUSCULAR ONCE
Status: COMPLETED | OUTPATIENT
Start: 2023-09-28 | End: 2023-09-28

## 2023-09-28 RX ADMIN — MEDROXYPROGESTERONE ACETATE 150 MG: 150 INJECTION, SUSPENSION INTRAMUSCULAR at 16:34

## 2023-09-28 NOTE — PROGRESS NOTES
The patient is here for a depo injection in the LEFT DELT. No bleeding or cramping. The patient has no concerns. The patient tolerated the injection well.

## 2023-10-06 DIAGNOSIS — F32.A ANXIETY AND DEPRESSION: ICD-10-CM

## 2023-10-06 DIAGNOSIS — F41.9 ANXIETY AND DEPRESSION: ICD-10-CM

## 2023-10-06 RX ORDER — ARIPIPRAZOLE 2 MG/1
2 TABLET ORAL DAILY
Qty: 30 TABLET | Refills: 0 | Status: SHIPPED | OUTPATIENT
Start: 2023-10-06

## 2023-11-14 ENCOUNTER — TELEPHONE (OUTPATIENT)
Age: 33
End: 2023-11-14

## 2023-11-14 DIAGNOSIS — F32.A ANXIETY AND DEPRESSION: ICD-10-CM

## 2023-11-14 DIAGNOSIS — F41.9 ANXIETY AND DEPRESSION: ICD-10-CM

## 2023-11-14 RX ORDER — ARIPIPRAZOLE 2 MG/1
2 TABLET ORAL DAILY
Qty: 30 TABLET | Refills: 6 | Status: SHIPPED | OUTPATIENT
Start: 2023-11-14

## 2023-11-14 NOTE — TELEPHONE ENCOUNTER
Patient called in regards to denial of her Abilify. States did see psychiatry and did not have a good experience.  States will call psychiatry as well but would like to know if medication can be refilled, Alyssa Munoz in [place] and can be reached at [phone]

## 2023-11-14 NOTE — TELEPHONE ENCOUNTER
Advise pt I refilled it for her as long as it is working have pt make appt for her physical when it is due I will need to check her blood sugar since sometimes abilify can affect this

## 2023-12-09 DIAGNOSIS — I10 ESSENTIAL HYPERTENSION: ICD-10-CM

## 2023-12-10 DIAGNOSIS — F33.41 RECURRENT MAJOR DEPRESSIVE DISORDER, IN PARTIAL REMISSION (HCC): ICD-10-CM

## 2023-12-11 RX ORDER — CITALOPRAM 40 MG/1
40 TABLET ORAL DAILY
Qty: 30 TABLET | Refills: 0 | Status: SHIPPED | OUTPATIENT
Start: 2023-12-11

## 2023-12-11 RX ORDER — HYDROCHLOROTHIAZIDE 12.5 MG/1
12.5 TABLET ORAL DAILY
Qty: 30 TABLET | Refills: 0 | Status: SHIPPED | OUTPATIENT
Start: 2023-12-11

## 2023-12-14 ENCOUNTER — OFFICE VISIT (OUTPATIENT)
Dept: GYNECOLOGY | Facility: CLINIC | Age: 33
End: 2023-12-14
Payer: COMMERCIAL

## 2023-12-14 VITALS
BODY MASS INDEX: 30.39 KG/M2 | WEIGHT: 182.4 LBS | DIASTOLIC BLOOD PRESSURE: 82 MMHG | HEIGHT: 65 IN | SYSTOLIC BLOOD PRESSURE: 110 MMHG

## 2023-12-14 DIAGNOSIS — Z30.42 ENCOUNTER FOR SURVEILLANCE OF INJECTABLE CONTRACEPTIVE: Primary | ICD-10-CM

## 2023-12-14 PROCEDURE — 96372 THER/PROPH/DIAG INJ SC/IM: CPT

## 2023-12-14 RX ADMIN — MEDROXYPROGESTERONE ACETATE 150 MG: 150 INJECTION, SUSPENSION INTRAMUSCULAR at 08:00

## 2023-12-14 NOTE — PROGRESS NOTES
The patient is here for a depo injection in the RIGHT DELT. No bleeding or cramping. The patient has no concerns. The patient tolerated the injection well.

## 2023-12-15 RX ORDER — MEDROXYPROGESTERONE ACETATE 150 MG/ML
150 INJECTION, SUSPENSION INTRAMUSCULAR ONCE
Status: COMPLETED | OUTPATIENT
Start: 2023-12-15 | End: 2023-12-14

## 2024-01-07 DIAGNOSIS — I10 ESSENTIAL HYPERTENSION: ICD-10-CM

## 2024-01-08 RX ORDER — HYDROCHLOROTHIAZIDE 12.5 MG/1
12.5 TABLET ORAL DAILY
Qty: 30 TABLET | Refills: 0 | Status: SHIPPED | OUTPATIENT
Start: 2024-01-08

## 2024-02-13 DIAGNOSIS — I10 ESSENTIAL HYPERTENSION: ICD-10-CM

## 2024-02-14 RX ORDER — HYDROCHLOROTHIAZIDE 12.5 MG/1
12.5 TABLET ORAL DAILY
Qty: 30 TABLET | Refills: 0 | Status: SHIPPED | OUTPATIENT
Start: 2024-02-14

## 2024-03-01 ENCOUNTER — OFFICE VISIT (OUTPATIENT)
Dept: GYNECOLOGY | Facility: CLINIC | Age: 34
End: 2024-03-01
Payer: COMMERCIAL

## 2024-03-01 VITALS
SYSTOLIC BLOOD PRESSURE: 118 MMHG | BODY MASS INDEX: 30.16 KG/M2 | HEIGHT: 65 IN | WEIGHT: 181 LBS | DIASTOLIC BLOOD PRESSURE: 80 MMHG

## 2024-03-01 DIAGNOSIS — Z30.42 ENCOUNTER FOR SURVEILLANCE OF INJECTABLE CONTRACEPTIVE: Primary | ICD-10-CM

## 2024-03-01 PROCEDURE — 96372 THER/PROPH/DIAG INJ SC/IM: CPT

## 2024-03-01 RX ORDER — MEDROXYPROGESTERONE ACETATE 150 MG/ML
150 INJECTION, SUSPENSION INTRAMUSCULAR ONCE
Status: COMPLETED | OUTPATIENT
Start: 2024-03-01 | End: 2024-03-01

## 2024-03-01 RX ADMIN — MEDROXYPROGESTERONE ACETATE 150 MG: 150 INJECTION, SUSPENSION INTRAMUSCULAR at 09:15

## 2024-03-01 NOTE — PROGRESS NOTES
The patient is here for a depo injection in the Left DELT.      Some spotting. The patient has no concerns.      The patient tolerated the injection well.

## 2024-03-10 DIAGNOSIS — I10 ESSENTIAL HYPERTENSION: ICD-10-CM

## 2024-03-11 RX ORDER — HYDROCHLOROTHIAZIDE 12.5 MG/1
12.5 TABLET ORAL DAILY
Qty: 30 TABLET | Refills: 0 | Status: SHIPPED | OUTPATIENT
Start: 2024-03-11

## 2024-04-12 DIAGNOSIS — I10 ESSENTIAL HYPERTENSION: ICD-10-CM

## 2024-04-12 RX ORDER — HYDROCHLOROTHIAZIDE 12.5 MG/1
12.5 TABLET ORAL DAILY
Qty: 30 TABLET | Refills: 0 | Status: SHIPPED | OUTPATIENT
Start: 2024-04-12

## 2024-05-10 DIAGNOSIS — I10 ESSENTIAL HYPERTENSION: ICD-10-CM

## 2024-05-10 RX ORDER — HYDROCHLOROTHIAZIDE 12.5 MG/1
12.5 TABLET ORAL DAILY
Qty: 30 TABLET | Refills: 5 | Status: SHIPPED | OUTPATIENT
Start: 2024-05-10

## 2024-05-12 DIAGNOSIS — Z30.42 SURVEILLANCE OF CONTRACEPTIVE INJECTION: ICD-10-CM

## 2024-05-14 RX ORDER — MEDROXYPROGESTERONE ACETATE 150 MG/ML
150 INJECTION, SUSPENSION INTRAMUSCULAR
Qty: 1 ML | Refills: 0 | Status: SHIPPED | OUTPATIENT
Start: 2024-05-14

## 2024-05-17 ENCOUNTER — TELEPHONE (OUTPATIENT)
Dept: GYNECOLOGY | Facility: CLINIC | Age: 34
End: 2024-05-17

## 2024-05-17 NOTE — TELEPHONE ENCOUNTER
Pt called in stating to cancel her Depo Inj appt for today because her mom was able to administer it to her.    She received it in her Right Delt and the Lot #[id number] and exp date is 10/2025.    She will be due for her next injection in August of 2024.    Pt given to Jamila to schedule next inj.

## 2024-07-30 DIAGNOSIS — Z30.42 SURVEILLANCE OF CONTRACEPTIVE INJECTION: ICD-10-CM

## 2024-07-30 RX ORDER — MEDROXYPROGESTERONE ACETATE 150 MG/ML
150 INJECTION, SUSPENSION INTRAMUSCULAR
Qty: 1 ML | Refills: 0 | Status: SHIPPED | OUTPATIENT
Start: 2024-07-30

## 2024-08-02 ENCOUNTER — CLINICAL SUPPORT (OUTPATIENT)
Dept: GYNECOLOGY | Facility: CLINIC | Age: 34
End: 2024-08-02
Payer: COMMERCIAL

## 2024-08-02 VITALS — DIASTOLIC BLOOD PRESSURE: 82 MMHG | SYSTOLIC BLOOD PRESSURE: 120 MMHG

## 2024-08-02 DIAGNOSIS — Z30.42 ENCOUNTER FOR SURVEILLANCE OF INJECTABLE CONTRACEPTIVE: Primary | ICD-10-CM

## 2024-08-02 PROCEDURE — 96372 THER/PROPH/DIAG INJ SC/IM: CPT

## 2024-08-02 RX ORDER — MEDROXYPROGESTERONE ACETATE 150 MG/ML
150 INJECTION, SUSPENSION INTRAMUSCULAR ONCE
Status: COMPLETED | OUTPATIENT
Start: 2024-08-02 | End: 2024-08-02

## 2024-08-02 RX ADMIN — MEDROXYPROGESTERONE ACETATE 150 MG: 150 INJECTION, SUSPENSION INTRAMUSCULAR at 16:03

## 2024-08-02 NOTE — PROGRESS NOTES
The patient was given a depo injection in the LEFT DELT.     No bleeding or cramping. The patient has no concerns.     The patient tolerated the injection well.

## 2024-09-16 ENCOUNTER — APPOINTMENT (OUTPATIENT)
Dept: LAB | Age: 34
End: 2024-09-16
Payer: COMMERCIAL

## 2024-09-16 DIAGNOSIS — F33.1 MAJOR DEPRESSIVE DISORDER, RECURRENT EPISODE, MODERATE (HCC): ICD-10-CM

## 2024-09-16 LAB
ALBUMIN SERPL BCG-MCNC: 4 G/DL (ref 3.5–5)
ALP SERPL-CCNC: 76 U/L (ref 34–104)
ALT SERPL W P-5'-P-CCNC: 68 U/L (ref 7–52)
ANION GAP SERPL CALCULATED.3IONS-SCNC: 10 MMOL/L (ref 4–13)
AST SERPL W P-5'-P-CCNC: 35 U/L (ref 13–39)
BASOPHILS # BLD AUTO: 0.02 THOUSANDS/ΜL (ref 0–0.1)
BASOPHILS NFR BLD AUTO: 0 % (ref 0–1)
BILIRUB SERPL-MCNC: 0.76 MG/DL (ref 0.2–1)
BUN SERPL-MCNC: 14 MG/DL (ref 5–25)
CALCIUM SERPL-MCNC: 9.5 MG/DL (ref 8.4–10.2)
CHLORIDE SERPL-SCNC: 104 MMOL/L (ref 96–108)
CHOLEST SERPL-MCNC: 225 MG/DL
CO2 SERPL-SCNC: 24 MMOL/L (ref 21–32)
CREAT SERPL-MCNC: 0.79 MG/DL (ref 0.6–1.3)
EOSINOPHIL # BLD AUTO: 0.41 THOUSAND/ΜL (ref 0–0.61)
EOSINOPHIL NFR BLD AUTO: 7 % (ref 0–6)
ERYTHROCYTE [DISTWIDTH] IN BLOOD BY AUTOMATED COUNT: 12.9 % (ref 11.6–15.1)
GFR SERPL CREATININE-BSD FRML MDRD: 97 ML/MIN/1.73SQ M
GLUCOSE P FAST SERPL-MCNC: 102 MG/DL (ref 65–99)
HCT VFR BLD AUTO: 44.3 % (ref 34.8–46.1)
HDLC SERPL-MCNC: 44 MG/DL
HGB BLD-MCNC: 14.1 G/DL (ref 11.5–15.4)
IMM GRANULOCYTES # BLD AUTO: 0.06 THOUSAND/UL (ref 0–0.2)
IMM GRANULOCYTES NFR BLD AUTO: 1 % (ref 0–2)
LDLC SERPL CALC-MCNC: 158 MG/DL (ref 0–100)
LYMPHOCYTES # BLD AUTO: 1.88 THOUSANDS/ΜL (ref 0.6–4.47)
LYMPHOCYTES NFR BLD AUTO: 33 % (ref 14–44)
MCH RBC QN AUTO: 28.7 PG (ref 26.8–34.3)
MCHC RBC AUTO-ENTMCNC: 31.8 G/DL (ref 31.4–37.4)
MCV RBC AUTO: 90 FL (ref 82–98)
MONOCYTES # BLD AUTO: 0.6 THOUSAND/ΜL (ref 0.17–1.22)
MONOCYTES NFR BLD AUTO: 11 % (ref 4–12)
NEUTROPHILS # BLD AUTO: 2.71 THOUSANDS/ΜL (ref 1.85–7.62)
NEUTS SEG NFR BLD AUTO: 48 % (ref 43–75)
NONHDLC SERPL-MCNC: 181 MG/DL
NRBC BLD AUTO-RTO: 0 /100 WBCS
PLATELET # BLD AUTO: 267 THOUSANDS/UL (ref 149–390)
PMV BLD AUTO: 12 FL (ref 8.9–12.7)
POTASSIUM SERPL-SCNC: 4.4 MMOL/L (ref 3.5–5.3)
PROT SERPL-MCNC: 6.8 G/DL (ref 6.4–8.4)
RBC # BLD AUTO: 4.91 MILLION/UL (ref 3.81–5.12)
SODIUM SERPL-SCNC: 138 MMOL/L (ref 135–147)
TRIGL SERPL-MCNC: 116 MG/DL
WBC # BLD AUTO: 5.68 THOUSAND/UL (ref 4.31–10.16)

## 2024-09-16 PROCEDURE — 80061 LIPID PANEL: CPT

## 2024-09-16 PROCEDURE — 80053 COMPREHEN METABOLIC PANEL: CPT

## 2024-09-16 PROCEDURE — 85025 COMPLETE CBC W/AUTO DIFF WBC: CPT

## 2024-09-16 PROCEDURE — 36415 COLL VENOUS BLD VENIPUNCTURE: CPT

## 2024-09-17 ENCOUNTER — ANNUAL EXAM (OUTPATIENT)
Dept: GYNECOLOGY | Facility: CLINIC | Age: 34
End: 2024-09-17
Payer: COMMERCIAL

## 2024-09-17 VITALS
HEIGHT: 65 IN | WEIGHT: 190 LBS | SYSTOLIC BLOOD PRESSURE: 124 MMHG | DIASTOLIC BLOOD PRESSURE: 80 MMHG | BODY MASS INDEX: 31.65 KG/M2

## 2024-09-17 DIAGNOSIS — Z01.419 ENCOUNTER FOR WELL WOMAN EXAM: Primary | ICD-10-CM

## 2024-09-17 DIAGNOSIS — Z11.51 SCREENING FOR HPV (HUMAN PAPILLOMAVIRUS): ICD-10-CM

## 2024-09-17 DIAGNOSIS — Z12.39 ENCOUNTER FOR SCREENING BREAST EXAMINATION: ICD-10-CM

## 2024-09-17 DIAGNOSIS — Z30.42 SURVEILLANCE OF CONTRACEPTIVE INJECTION: ICD-10-CM

## 2024-09-17 DIAGNOSIS — Z12.4 CERVICAL CANCER SCREENING: ICD-10-CM

## 2024-09-17 DIAGNOSIS — Z01.419 ROUTINE GYNECOLOGICAL EXAMINATION: ICD-10-CM

## 2024-09-17 PROCEDURE — G0476 HPV COMBO ASSAY CA SCREEN: HCPCS | Performed by: PHYSICIAN ASSISTANT

## 2024-09-17 PROCEDURE — G0145 SCR C/V CYTO,THINLAYER,RESCR: HCPCS | Performed by: PHYSICIAN ASSISTANT

## 2024-09-17 PROCEDURE — 99395 PREV VISIT EST AGE 18-39: CPT | Performed by: PHYSICIAN ASSISTANT

## 2024-09-17 RX ORDER — MEDROXYPROGESTERONE ACETATE 150 MG/ML
150 INJECTION, SUSPENSION INTRAMUSCULAR
Qty: 1 ML | Refills: 4 | Status: SHIPPED | OUTPATIENT
Start: 2024-09-17

## 2024-09-17 NOTE — PROGRESS NOTES
Assessment/Plan:      Diagnoses and all orders for this visit:    Encounter for well woman exam    Encounter for screening breast examination    Cervical cancer screening    Screening for HPV (human papillomavirus)  -     Liquid-based pap, screening    Surveillance of contraceptive injection  -     medroxyPROGESTERone (DEPO-PROVERA) 150 mg/mL injection; Inject 1 mL (150 mg total) into a muscle every 3 (three) months    Routine gynecological examination  -     Liquid-based pap, screening    Other orders  -     sertraline (ZOLOFT) 50 mg tablet; Take 50 mg by mouth daily          Subjective:     Patient ID: Sheridan Lindsey is a 33 y.o. female.    Pt presents for her annual exam today--  She has no complaints  She has no bleeding or pelvic pain--on DEPO  Bowel and bladder are regular  Colonoscopy--  No breast concerns today  Last mammo--    pap today.    Rx DEPO 150mg  Daily mvi        Review of Systems   Constitutional:  Negative for chills, fever and unexpected weight change.   HENT:  Negative for ear pain and sore throat.    Eyes:  Negative for pain and visual disturbance.   Respiratory:  Negative for cough and shortness of breath.    Cardiovascular:  Negative for chest pain and palpitations.   Gastrointestinal:  Negative for abdominal pain, blood in stool, constipation, diarrhea and vomiting.   Genitourinary: Negative.  Negative for dysuria and hematuria.   Musculoskeletal:  Negative for arthralgias and back pain.   Skin:  Negative for color change and rash.   Neurological:  Negative for seizures and syncope.   All other systems reviewed and are negative.        Objective:     Physical Exam  Vitals and nursing note reviewed.   Constitutional:       Appearance: Normal appearance. She is well-developed.   HENT:      Head: Normocephalic and atraumatic.   Chest:   Breasts:     Right: No inverted nipple, mass, nipple discharge or skin change.      Left: No inverted nipple, mass, nipple discharge or skin change.    Abdominal:      Palpations: Abdomen is soft.   Genitourinary:     General: Normal vulva.      Exam position: Supine.      Labia:         Right: No rash, tenderness or lesion.         Left: No rash, tenderness or lesion.       Vagina: Normal.      Cervix: No cervical motion tenderness, discharge or friability.      Uterus: Normal.       Adnexa: Right adnexa normal and left adnexa normal.        Right: No mass, tenderness or fullness.          Left: No mass, tenderness or fullness.     Musculoskeletal:      Cervical back: Normal range of motion.   Lymphadenopathy:      Lower Body: No right inguinal adenopathy. No left inguinal adenopathy.   Neurological:      Mental Status: She is alert.

## 2024-09-19 LAB
HPV HR 12 DNA CVX QL NAA+PROBE: POSITIVE
HPV16 DNA CVX QL NAA+PROBE: NEGATIVE
HPV18 DNA CVX QL NAA+PROBE: NEGATIVE

## 2024-09-23 LAB
LAB AP GYN PRIMARY INTERPRETATION: NORMAL
Lab: NORMAL
PATH INTERP SPEC-IMP: NORMAL

## 2024-09-24 DIAGNOSIS — N76.0 BV (BACTERIAL VAGINOSIS): Primary | ICD-10-CM

## 2024-09-24 DIAGNOSIS — B96.89 BV (BACTERIAL VAGINOSIS): Primary | ICD-10-CM

## 2024-09-25 RX ORDER — METRONIDAZOLE 7.5 MG/G
1 GEL VAGINAL
Qty: 5 G | Refills: 0 | Status: SHIPPED | OUTPATIENT
Start: 2024-09-25 | End: 2024-09-30

## 2024-10-18 ENCOUNTER — CLINICAL SUPPORT (OUTPATIENT)
Dept: GYNECOLOGY | Facility: CLINIC | Age: 34
End: 2024-10-18
Payer: COMMERCIAL

## 2024-10-18 VITALS — SYSTOLIC BLOOD PRESSURE: 120 MMHG | DIASTOLIC BLOOD PRESSURE: 82 MMHG

## 2024-10-18 DIAGNOSIS — Z30.42 ENCOUNTER FOR SURVEILLANCE OF INJECTABLE CONTRACEPTIVE: Primary | ICD-10-CM

## 2024-10-18 PROCEDURE — 96372 THER/PROPH/DIAG INJ SC/IM: CPT

## 2024-10-18 RX ORDER — MEDROXYPROGESTERONE ACETATE 150 MG/ML
150 INJECTION, SUSPENSION INTRAMUSCULAR ONCE
Status: COMPLETED | OUTPATIENT
Start: 2024-10-18 | End: 2024-10-18

## 2024-10-18 RX ORDER — SERTRALINE HYDROCHLORIDE 100 MG/1
150 TABLET, FILM COATED ORAL DAILY
COMMUNITY

## 2024-10-18 RX ADMIN — MEDROXYPROGESTERONE ACETATE 150 MG: 150 INJECTION, SUSPENSION INTRAMUSCULAR at 08:15

## 2024-12-24 DIAGNOSIS — Z30.011 INITIATION OF OCP (BCP): Primary | ICD-10-CM

## 2024-12-24 RX ORDER — ACETAMINOPHEN AND CODEINE PHOSPHATE 120; 12 MG/5ML; MG/5ML
1 SOLUTION ORAL DAILY
Qty: 90 TABLET | Refills: 2 | Status: SHIPPED | OUTPATIENT
Start: 2024-12-24